# Patient Record
Sex: MALE | Race: WHITE | NOT HISPANIC OR LATINO | Employment: FULL TIME | URBAN - METROPOLITAN AREA
[De-identification: names, ages, dates, MRNs, and addresses within clinical notes are randomized per-mention and may not be internally consistent; named-entity substitution may affect disease eponyms.]

---

## 2017-12-08 ENCOUNTER — OFFICE VISIT (OUTPATIENT)
Dept: FAMILY MEDICINE | Facility: CLINIC | Age: 47
End: 2017-12-08
Payer: MEDICAID

## 2017-12-08 VITALS
SYSTOLIC BLOOD PRESSURE: 114 MMHG | BODY MASS INDEX: 28.95 KG/M2 | TEMPERATURE: 99 F | HEART RATE: 64 BPM | HEIGHT: 68 IN | DIASTOLIC BLOOD PRESSURE: 60 MMHG | RESPIRATION RATE: 18 BRPM | WEIGHT: 191 LBS

## 2017-12-08 DIAGNOSIS — G47.00 INSOMNIA, UNSPECIFIED TYPE: ICD-10-CM

## 2017-12-08 DIAGNOSIS — J32.9 SINUSITIS, UNSPECIFIED CHRONICITY, UNSPECIFIED LOCATION: ICD-10-CM

## 2017-12-08 DIAGNOSIS — F41.9 ANXIETY: Primary | ICD-10-CM

## 2017-12-08 PROCEDURE — 99214 OFFICE O/P EST MOD 30 MIN: CPT | Mod: S$GLB,,, | Performed by: NURSE PRACTITIONER

## 2017-12-08 RX ORDER — TRAZODONE HYDROCHLORIDE 50 MG/1
50 TABLET ORAL NIGHTLY PRN
Qty: 30 TABLET | Refills: 11 | Status: SHIPPED | OUTPATIENT
Start: 2017-12-08 | End: 2018-06-20 | Stop reason: SDUPTHER

## 2017-12-08 RX ORDER — AMOXICILLIN AND CLAVULANATE POTASSIUM 875; 125 MG/1; MG/1
1 TABLET, FILM COATED ORAL EVERY 12 HOURS
Qty: 14 TABLET | Refills: 0 | Status: SHIPPED | OUTPATIENT
Start: 2017-12-08 | End: 2018-06-20 | Stop reason: ALTCHOICE

## 2017-12-08 RX ORDER — DULOXETIN HYDROCHLORIDE 20 MG/1
20 CAPSULE, DELAYED RELEASE ORAL DAILY
Qty: 30 CAPSULE | Refills: 11 | Status: SHIPPED | OUTPATIENT
Start: 2017-12-08 | End: 2018-08-30

## 2017-12-08 NOTE — PROGRESS NOTES
Subjective:       Patient ID: Jimmy Elaine Jr. is a 47 y.o. male.    Chief Complaint: No chief complaint on file.    The patient is here for follow-up visit.  He is with his wife today.  He did suffer significant anxiety since ending of his last job.  We did start Cymbalta at that time.  He does not know if this medication is still helping him or not.  He still has severe anxiety and depression at times. No SI/HI.  He is not sleeping well.  He does feel like he sleeps well with trazodone but he does not have anymore.  He does still have his OCD tendencies.  He is also having URI symptoms. See URI ROS.    URI    This is a new problem. Episode onset: 2 weeks. Associated symptoms include coughing, rhinorrhea, sinus pain, sneezing, a sore throat and swollen glands. Pertinent negatives include no abdominal pain, chest pain, congestion, diarrhea, dysuria, headaches, nausea, rash or vomiting. Associated symptoms comments: Thick green and yellow mucus. He has tried decongestant and acetaminophen for the symptoms. The treatment provided no relief.     Review of Systems   Constitutional: Positive for fatigue. Negative for activity change and appetite change.   HENT: Positive for postnasal drip, rhinorrhea, sinus pain, sinus pressure, sneezing and sore throat. Negative for congestion.    Eyes: Negative for pain and redness.   Respiratory: Positive for cough. Negative for choking, chest tightness and shortness of breath.    Cardiovascular: Negative for chest pain and palpitations.   Gastrointestinal: Negative for abdominal distention, abdominal pain, blood in stool, constipation, diarrhea, nausea and vomiting.   Endocrine: Negative for polydipsia and polyphagia.   Genitourinary: Negative for dysuria and hematuria.   Musculoskeletal: Negative for arthralgias, joint swelling and myalgias.   Skin: Negative for color change, pallor and rash.   Neurological: Negative for dizziness, light-headedness and headaches.  "  Psychiatric/Behavioral: Negative for agitation and behavioral problems.       Objective:       Vitals:    12/08/17 1415   BP: 114/60   Pulse: 64   Resp: 18   Temp: 98.6 °F (37 °C)   TempSrc: Oral   Weight: 86.6 kg (191 lb)   Height: 5' 8" (1.727 m)   PainSc: 0-No pain       Physical Exam   Constitutional: He is oriented to person, place, and time. He appears well-developed and well-nourished. No distress.   HENT:   Head: Normocephalic and atraumatic.   Right Ear: Hearing, tympanic membrane, external ear and ear canal normal.   Left Ear: Hearing, tympanic membrane, external ear and ear canal normal.   Nose: Nose normal. No nose lacerations or sinus tenderness.   Mouth/Throat: Uvula is midline, oropharynx is clear and moist and mucous membranes are normal.   Eyes: Conjunctivae and EOM are normal. Pupils are equal, round, and reactive to light. Right eye exhibits no discharge. Left eye exhibits no discharge. No scleral icterus.   Neck: Trachea normal and normal range of motion. Neck supple. No JVD present. Carotid bruit is not present. No tracheal deviation present. No thyromegaly present.   Cardiovascular: Normal rate and regular rhythm.  Exam reveals no gallop and no friction rub.    No murmur heard.  Pulmonary/Chest: Effort normal and breath sounds normal. No stridor. No respiratory distress. He has no wheezes. He has no rales. He exhibits no tenderness.   Abdominal: Soft. Bowel sounds are normal. He exhibits no distension and no mass. There is no tenderness. There is no rebound and no guarding.   Musculoskeletal: Normal range of motion.   Lymphadenopathy:     He has cervical adenopathy.   Neurological: He is alert and oriented to person, place, and time. Coordination normal.   Skin: Skin is warm and dry. He is not diaphoretic.   Psychiatric: He has a normal mood and affect. His behavior is normal. Judgment and thought content normal.       Assessment:       1. Anxiety    2. Insomnia, unspecified type    3. " Sinusitis, unspecified chronicity, unspecified location        Plan:       Jimmy was seen today for anxiety.    Diagnoses and all orders for this visit:    Anxiety  -     DULoxetine (CYMBALTA) 20 MG capsule; Take 1 capsule (20 mg total) by mouth once daily.  He did not want me to adjust the dose of his medication    Insomnia, unspecified type    -     traZODone (DESYREL) 50 MG tablet; Take 1 tablet (50 mg total) by mouth nightly as needed for Insomnia.    Sinusitis, unspecified chronicity, unspecified location  -     amoxicillin-clavulanate 875-125mg (AUGMENTIN) 875-125 mg per tablet; Take 1 tablet by mouth every 12 (twelve) hours.

## 2017-12-10 RX ORDER — PROMETHAZINE HYDROCHLORIDE AND CODEINE PHOSPHATE 6.25; 1 MG/5ML; MG/5ML
5 SOLUTION ORAL EVERY 4 HOURS PRN
Qty: 240 ML | Refills: 0 | Status: SHIPPED | OUTPATIENT
Start: 2017-12-10 | End: 2017-12-20

## 2018-05-10 ENCOUNTER — TELEPHONE (OUTPATIENT)
Dept: OPHTHALMOLOGY | Facility: CLINIC | Age: 48
End: 2018-05-10

## 2018-05-10 NOTE — TELEPHONE ENCOUNTER
----- Message from Claribel Tyler sent at 5/10/2018 11:37 AM CDT -----  [facility]  Appointment Request From: Jimmy Elaine Jr.    With Provider: Other - (see comments)    Would Accept With:Request to see a new provider    Preferred Date Range: Any date 5/15/2018 or later    Preferred Times: Any    Reason for visit: Request an Appt    Comments:  I need to see an opthamologist.

## 2018-06-20 ENCOUNTER — OFFICE VISIT (OUTPATIENT)
Dept: FAMILY MEDICINE | Facility: CLINIC | Age: 48
End: 2018-06-20
Payer: COMMERCIAL

## 2018-06-20 VITALS
HEART RATE: 72 BPM | BODY MASS INDEX: 28.95 KG/M2 | HEIGHT: 68 IN | TEMPERATURE: 99 F | DIASTOLIC BLOOD PRESSURE: 76 MMHG | WEIGHT: 191 LBS | RESPIRATION RATE: 18 BRPM | SYSTOLIC BLOOD PRESSURE: 132 MMHG

## 2018-06-20 DIAGNOSIS — G47.00 INSOMNIA, UNSPECIFIED TYPE: ICD-10-CM

## 2018-06-20 DIAGNOSIS — M77.11 LATERAL EPICONDYLITIS OF RIGHT ELBOW: Primary | ICD-10-CM

## 2018-06-20 DIAGNOSIS — F41.9 ANXIETY: ICD-10-CM

## 2018-06-20 DIAGNOSIS — Z00.00 LABORATORY EXAM ORDERED AS PART OF ROUTINE GENERAL MEDICAL EXAMINATION: ICD-10-CM

## 2018-06-20 PROCEDURE — 99214 OFFICE O/P EST MOD 30 MIN: CPT | Mod: S$GLB,,, | Performed by: NURSE PRACTITIONER

## 2018-06-20 RX ORDER — TRAZODONE HYDROCHLORIDE 100 MG/1
100 TABLET ORAL NIGHTLY PRN
Qty: 30 TABLET | Refills: 11 | Status: SHIPPED | OUTPATIENT
Start: 2018-06-20 | End: 2018-10-23

## 2018-06-20 RX ORDER — METHYLPREDNISOLONE 4 MG/1
TABLET ORAL
Qty: 1 PACKAGE | Refills: 0 | Status: SHIPPED | OUTPATIENT
Start: 2018-06-20 | End: 2018-07-11

## 2018-06-21 NOTE — PROGRESS NOTES
"Subjective:       Patient ID: Jimmy Elaine Jr. is a 47 y.o. male.    Chief Complaint: Follow-up    The patient is here for a follow-up visit.  He is on Cymbalta 20 mg for his anxiety which he feels has helped a little but he is still having a great deal of anxiety and stress.  He is not interested in increasing his medication but tells me he is undergoing a lot of legal issues at this time with his last this job.        He does tell me that he is having significant elbow pain on the right side from repetitive motion.  He states this has been present for the past couple months and unresponsive to NSAIDs.    S he is still having problems sleeping at night and tells me the trazodone 50 mg is not working anymore.      Review of Systems   Constitutional: Negative for activity change and appetite change.   HENT: Negative for congestion, postnasal drip, rhinorrhea and sinus pressure.    Eyes: Negative for pain and redness.   Respiratory: Negative for choking and chest tightness.    Gastrointestinal: Negative for abdominal distention, abdominal pain, blood in stool, constipation, diarrhea, nausea and vomiting.   Endocrine: Negative for polydipsia and polyphagia.   Genitourinary: Negative for dysuria and hematuria.   Musculoskeletal: Negative for arthralgias and myalgias.   Skin: Negative for color change and rash.   Neurological: Negative for dizziness and headaches.   Psychiatric/Behavioral: Negative for agitation and behavioral problems.       Objective:       Vitals:    06/20/18 1601   BP: 132/76   Pulse: 72   Resp: 18   Temp: 99.2 °F (37.3 °C)   TempSrc: Oral   Weight: 86.6 kg (191 lb)   Height: 5' 8" (1.727 m)   PainSc:   5   PainLoc: Arm     This  Physical Exam   Constitutional: He is oriented to person, place, and time. He appears well-developed and well-nourished. No distress.   HENT:   Head: Normocephalic and atraumatic.   Right Ear: Hearing, tympanic membrane, external ear and ear canal normal.   Left Ear: " Hearing, tympanic membrane, external ear and ear canal normal.   Nose: Nose normal.   Mouth/Throat: Uvula is midline, oropharynx is clear and moist and mucous membranes are normal.   Eyes: Conjunctivae and EOM are normal. Pupils are equal, round, and reactive to light. Right eye exhibits no discharge. Left eye exhibits no discharge.   Neck: Trachea normal and normal range of motion. Neck supple. No JVD present. Carotid bruit is not present. No thyromegaly present.   Cardiovascular: Normal rate and regular rhythm.  Exam reveals no gallop and no friction rub.    No murmur heard.  Pulmonary/Chest: Effort normal and breath sounds normal. No respiratory distress. He has no wheezes. He has no rales. He exhibits no tenderness.   Abdominal: Soft. Bowel sounds are normal. He exhibits no distension and no mass. There is no tenderness. There is no rebound and no guarding.   Musculoskeletal: Normal range of motion.        Arms:  Neurological: He is alert and oriented to person, place, and time. Coordination normal.   Skin: Skin is warm and dry. He is not diaphoretic.   Psychiatric: He has a normal mood and affect. His behavior is normal. Judgment and thought content normal.       Assessment:       1. Lateral epicondylitis of right elbow    2. Laboratory exam ordered as part of routine general medical examination    3. Insomnia, unspecified type    4. Anxiety        Plan:       Jimmy was seen today for follow-up.    Diagnoses and all orders for this visit:    Lateral epicondylitis of right elbow  -     methylPREDNISolone (MEDROL DOSEPACK) 4 mg tablet; use as directed    Laboratory exam ordered as part of routine general medical examination  -     CBC auto differential; Future  -     Comprehensive metabolic panel; Future  -     Hemoglobin A1c; Future  -     Lipid panel; Future  -     TSH; Future    Insomnia, unspecified type  Increase  -     traZODone (DESYREL) 100 MG tablet; Take 1 tablet (100 mg total) by mouth nightly as  needed for Insomnia.    Anxiety  Continue Cymbalta.

## 2018-08-27 RX ORDER — CEPHALEXIN 500 MG/1
500 CAPSULE ORAL EVERY 8 HOURS
Qty: 30 CAPSULE | Refills: 0 | Status: SHIPPED | OUTPATIENT
Start: 2018-08-27 | End: 2018-10-23

## 2018-08-30 RX ORDER — CITALOPRAM 20 MG/1
20 TABLET, FILM COATED ORAL DAILY
Qty: 90 TABLET | Refills: 3 | Status: SHIPPED | OUTPATIENT
Start: 2018-08-30 | End: 2018-10-23

## 2018-09-26 RX ORDER — IBUPROFEN 800 MG/1
800 TABLET ORAL 3 TIMES DAILY
Qty: 30 TABLET | Refills: 0 | Status: SHIPPED | OUTPATIENT
Start: 2018-09-26 | End: 2019-01-31 | Stop reason: SDUPTHER

## 2018-10-09 PROBLEM — M54.12 CERVICAL RADICULOPATHY: Status: ACTIVE | Noted: 2018-10-09

## 2018-11-20 ENCOUNTER — CLINICAL SUPPORT (OUTPATIENT)
Dept: REHABILITATION | Facility: HOSPITAL | Age: 48
End: 2018-11-20
Payer: MEDICAID

## 2018-11-20 DIAGNOSIS — R29.898 DECREASED RANGE OF MOTION OF NECK: ICD-10-CM

## 2018-11-20 DIAGNOSIS — M54.12 RIGHT CERVICAL RADICULOPATHY: ICD-10-CM

## 2018-11-20 DIAGNOSIS — R53.1 WEAKNESS: ICD-10-CM

## 2018-11-20 PROCEDURE — 97110 THERAPEUTIC EXERCISES: CPT | Mod: PO | Performed by: PHYSICAL THERAPIST

## 2018-11-20 PROCEDURE — 97162 PT EVAL MOD COMPLEX 30 MIN: CPT | Mod: PO | Performed by: PHYSICAL THERAPIST

## 2018-11-20 NOTE — PLAN OF CARE
"[facility]  Physical Therapy Initial Evaluation    Name: Jimmy Elaine Jr.  Clinic Number: [id number]    Therapy Diagnosis:   Encounter Diagnoses   Name Primary?    Right cervical radiculopathy     Weakness     Decreased range of motion of neck      Physician: Rodolfo Perez II, *    Physician Orders: PT Eval and Treat   Medical Diagnosis: Cervical radiculopathy at C8  Evaluation Date: 11/20/2018  Authorization: (1) 10/31/19  Plan of Care Certification Period: 1/18/19    Today's Date: 11/20/2018  Visit #: 1/24  Time In: 0705  Time Out: 0800  Total Billable Time: 55 minutes    Precautions: Standard,      Subjective     Date of onset: over a year ago, worsening here recently    History of current condition - Jimmy reports: he has pain 99% of the day. Pain travels from R neck to the post shoulder/shoulder blade, down the back of his R arm, into the dorsal surface of all digits. Pain described as "shooting, burning, electrical, shocking." Location of pain is worse at lat epicondyle and post shoulder. He originally thought he had tennis elbow, but it has gotten significantly worse in the last couple of months. He reports that he can do anything, it just really hurts. Pt is R handed. No issues with L UE. He has found that if he moves his neck a certain way (demonstrating L SB and slightly flexion), then the electrical pains stop.        Past Medical History:   Diagnosis Date    Anxiety     Colon polyps     GERD (gastroesophageal reflux disease)     Kidney stones        Jimmy Elaine Jr.  has a past surgical history that includes Vasectomy; Colonoscopy (06/29/2016); eye syrgery; Eye surgery; Cardiac catheterization (2014); and COLONOSCOPY (N/A, 6/29/2016).    Jimmy has a current medication list which includes the following prescription(s): ibuprofen.    Review of patient's allergies indicates:  No Known Allergies     IMAGING  MRI studies: 1. No acute osseous abnormality " is identified.  2. Multilevel disc osteophyte complexes and facet arthropathy with spinal canal and neural foraminal narrowing noted at multiple levels.  See level by level detail above.  There is no myelomalacia identified    XRAY CERVICAL: There are some mild spurring degenerative changes present.  No displaced fracture or dislocation is appreciated.    XRAY SHOULDER: There is some interval advancing degeneration about the shoulder with spurring and hypertrophy most pronounced about the AC joint as well as subacromial and glenohumeral joint space narrowing.  No interval displaced fracture or dislocation is appreciated.    Prior Therapy: Previous treatment included medical management. No PT this calendar year.  He had tried chiropractor once and didn't enjoy it so he never returned.   Social History: lives with their spouse in a single story home with 0 steps to enter and has no AD/medical equipment.   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 Frequently has to carry 60# buckets of solution. He has to be able to crawl, crouch, lift, etc.   Prior Level of Function: Pt was independent with all ADLs and iADLs without pain, ambulating without pain or deviation, driving independently.       Pain:  Current 5/10, worst 7/10, best 4/10   Location: R UE  Description: electrical, shocking  Aggravating Factors: working  Easing Factors: none    Pts goals: stop pain    Objective     Posture: flexed, rounded shoulders    Cervical Active Range of Motion   Degrees Pain   Flexion 48   Y, R neck        Extension 45   Y+++, R lat epicondyle        L Side Bend 35 Y, R lat epicondyle        R Side Bend 30 Y, R lat epicondyle         L Rotation   DNA DNA        R Rotation   DNA DNA             Strength   Right UE Left UE   Shoulder flexion: 4/5 4-/5   Shoulder extension:  4+/5 4+/5   Shoulder abduction: 4/5 4/5   Shoulder adduction: 4/5 5/5   Shoulder ER  "4/5 5/5   Shoulder IR 4-/5 5/5   Elbow flexion: 4+/5 5/5   Elbow extension: 4+/5 5/5   Wrist flexion: 4/5 5/5   Wrist extension: 4-/5 5/5   Thumb abduction 4-/5 5/5   Digit adduction 4-/5 5/5          Sensation: all peripheral nerves and dermatomes intact and equal bilaterally, except C6<7<8    Reflexes  Biceps: 1+ bilat  Brachioradialis: 1+ bilat  Triceps: 1+ bilat  Herrera: negative bilat    Special Tests  - TOS screen: negative  - VBI: negative  - Transverse Ligament (C2): negative  - Alar Ligament: negative  - Axial vs 3D Traction: positive for symptom reduction  - Suboccipital Release: positive for pain relief  - Mills Test (wrist flex): negative  - Tennis Elbow Test (3rd digit ext): negative for pain     Neuro Dynamic Testing  - Median nerve: DNA  - Ulnar nerve: DNA  - Radial nerve: positive R     Flexibility  - Pec major/minor: restricted bilat  - Levators: WFL bilat    Palpation: overactive cervical/thoracic musculature, very TTP R hand extensor muscle group mid-belly    Joint Mobility: DNA      CMS Impairment/Limitation/Restriction for FOTO NECK Survey    Therapist reviewed FOTO scores for Jimmy Elaine Jr. on 11/20/2018.   FOTO documents entered into [email or web address] - see Media section.    Limitation Score: 63%  Category: Body Position    Current : CL = least 60% but < 80% impaired, limited or restricted  Goal: CJ = at least 20% but < 40% impaired, limited or restricted         TREATMENT     Treatment Time In: 0735  Treatment Time Out: 0800  Total Treatment time separate from Evaluation: 25 min    Jimmy participated in therapeutic exercises to develop strength, endurance, ROM, core stabilization for 5 minutes including:  Chin tuck in supine 10x5"    Jimmy participated in neuromuscular re-education activities to improve nerve mobility for 10 minutes including:  Radial nerve glides on R x10 with hand by side (pro + wrist flex), x10 with arm at 90 deg abd (wrist flex + cervical R SB)    Skip received the " following manual therapy techniques to improve joint and tissue restrictions and pain management for 10 minutes including:  Manual cervical traction in neutral x3 min  Manual cervical traction at end range flexion with towel x5 min      EDUCATION  Education provided:   - Instructed on general anatomy/physiology  - Role of therapy in multi-disciplinary team  - Instructed in purpose of physical therapy and the benefits/risks of treatment  - Risks of refusing treatment  - POC and goals for therapy  No spiritual or educational barriers to learning provided    Written Home Exercises Provided:   Pt was provided with a written copy of exercises to perform at home. Jimmy demonstrated good  understanding of the education provided.     See EMR under Patient Instructions for exercises provided 11/20/2018.    Assessment     Jimmy is a 48 y.o. male referred to outpatient Physical Therapy with a medical diagnosis of Cervical radiculopathy at C8. Pt presents with signs and symptoms consistent with radicular nerve irritation and significant weakness of the lower cervical spine myotomes. Peripheralization of pain noted with progression of symptoms from nerve radicular pain, to myotome weakness, and now presenting with decreased cervical ROM. Pt with increased common extensor muscle group pain during radial nerve glide including pronation. Pt may benefit from DN to this muscle group for suspected concomitant R tennis elbow. Good improvements in pain with cervical traction.     Pt prognosis is Good.   Pt will benefit from skilled outpatient Physical Therapy to address the deficits stated above and in the chart below, provide pt/family education, and to maximize pt's level of independence.     Plan of care discussed with patient: Yes  Pt's spiritual, cultural and educational needs considered and patient is agreeable to the plan of care and goals as stated below:     Anticipated Barriers for therapy: none      Medical Necessity is  demonstrated by the following  History  Co-morbidities and personal factors that may impact the plan of care Co-morbidities:   anxiety, eye surgery    Personal Factors:   no deficits     moderate   Examination  Body Structures and Functions, activity limitations and participation restrictions that may impact the plan of care Body Regions/Systems/Functions:              1) Pain limiting function   2) Decreased ROM   3) Weakness   4) Improper joint mobility   5) Decreased motor control/coordination    Activity Limitations:  Lifting/carrying    Participation Restrictions:  Work duties, home chores, yard work         high   Clinical Presentation unstable clinical presentation with unpredictable characteristics high   Decision Making/ Complexity Score: moderate       GOALS  Short Term Goals:  4 weeks  1. Pt will report <6/10 pain to improve activity tolerance.   2. Pt will increase MMT by 1/3 grade in order to improve functional stability and strength.   3. Pt will increase ROM by 5 degrees in order to improve functional mobility.   4. Pt will tolerate HEP to improve impairments and independence with ADL's.     Long Term Goals: 8 weeks  1. Pt will report <2/10 pain to improve activity tolerance.   2. Pt will report at least CJ on FOTO NECK to demonstrate a decrease in disability and improvement in independence with functional activities.   3. Pt to demonstrate crouching/crawling with minimal increase in pain for improved ability to complete work duties.   4. Pt to carry 60# 5 feet with minimal increase in pain for improved ability to complete work duties.   5. Pt will be I with HEP for self-management of symptoms.     Plan     Certification Period: 11/20/2018 to 1/18/19    Outpatient Physical Therapy 3 times weekly for 8 weeks to include the following interventions: patient education, HEP, therapeutic exercises, neuromuscular re-education, therapeutic activity, manual therapy and dry needling, and modalities PRN to  achieve established goals.     Mojgan Douglas, PT

## 2018-11-23 ENCOUNTER — CLINICAL SUPPORT (OUTPATIENT)
Dept: REHABILITATION | Facility: HOSPITAL | Age: 48
End: 2018-11-23
Payer: MEDICAID

## 2018-11-23 DIAGNOSIS — R29.898 DECREASED RANGE OF MOTION OF NECK: ICD-10-CM

## 2018-11-23 DIAGNOSIS — M54.12 RIGHT CERVICAL RADICULOPATHY: ICD-10-CM

## 2018-11-23 DIAGNOSIS — R53.1 WEAKNESS: ICD-10-CM

## 2018-11-23 PROCEDURE — 97110 THERAPEUTIC EXERCISES: CPT | Mod: PO

## 2018-11-23 PROCEDURE — 97140 MANUAL THERAPY 1/> REGIONS: CPT | Mod: PO

## 2018-11-23 NOTE — PROGRESS NOTES
"Pt seen by Ziggy Abel PTA on this date.     Physical Therapy Daily Treatment Note     Name: Jimmy Elaine Jr.  Clinic Number: [id number]    Therapy Diagnosis:   Encounter Diagnoses   Name Primary?    Right cervical radiculopathy     Weakness     Decreased range of motion of neck      Physician: Rodolfo Perez II, *    Visit Date: 11/23/2018   Physician Orders: PT Eval and Treat   Medical Diagnosis: Cervical radiculopathy at C8  Evaluation Date: 11/20/2018  Authorization: (1) 10/31/19  Plan of Care Certification Period: 1/18/19     Today's Date: 11/20/2018  Visit #: 2/24  Time In: 0705  Time Out: 0800  Total Billable Time: 55 minutes     Precautions: Standard,       Subjective     Pt reports: R shld pn 6/10, states that he continues to have "electrical shock." sx down R LE  He was compliant with home exercise program.  Response to previous treatment: Pt reports some provocation of R UE sx with HEP  Functional change: none    Pain: 6/10  Location: right shoulder      Objective     Skip received therapeutic exercises to develop strength, ROM, flexibility and posture for 15 minutes including:  scap retraction 10x with 3 sec hold  supine iso ext 10x with 3 sec hold  supine chin tuck 10x  seated post cap stretch R shld 3 x 20 sec  standing UT stretch 3 x 20 sec  Radial nerve glide arm at side, 6x due to increase in "electrical " sx  Radial nerve glide arm at 90 degrees, 10x    Skip received the following manual therapy techniques: Manual traction and Soft tissue Mobilization were applied to the: neck for 30 minutes, including:  intermittent cervical traction x 15 min, manual B UT and lev scap stretch x 15 min with decreased R shld pn sx with manual R UT stretch.      Skip received hot pack for 10 minutes to increase circulation and promote tissue healing.      Home Exercises Provided and Patient Education Provided     Education provided:   - Pt instructed in UT stretch  Pt re-instructed in proper form with " HEP to avoid rotation of cervical spine with radial glide ex and excessive flex with chin tucks.  Pt instructed to perform HEP w/in pedro to not provoke sx    Written Home Exercises Provided: Patient instructed to cont prior HEP.  Exercises were reviewed and Royal was able to demonstrate them prior to the end of the session.  Royal demonstrated good  understanding of the education provided.     See EMR under Patient Instructions for exercises provided prior visit.    Assessment     Pt pedro tx with ther ex, manual tx well, decreased post R shld pn with manual UT stretch. Some provocation of R UE sx with review of HEP, improved with proper form post re-instruction  Royal is progressing well towards his goals.   Pt prognosis is Good.     Pt will continue to benefit from skilled outpatient physical therapy to address the deficits listed in the problem list box on initial evaluation, provide pt/family education and to maximize pt's level of independence in the home and community environment.     Pt's spiritual, cultural and educational needs considered and pt agreeable to plan of care and goals.    Anticipated barriers to physical therapy: none    Goals:   Short Term Goals:  4 weeks  1. Pt will report <6/10 pain to improve activity tolerance.   2. Pt will increase MMT by 1/3 grade in order to improve functional stability and strength.   3. Pt will increase ROM by 5 degrees in order to improve functional mobility.   4. Pt will tolerate HEP to improve impairments and independence with ADL's.      Long Term Goals: 8 weeks  1. Pt will report <2/10 pain to improve activity tolerance.   2. Pt will report at least CJ on FOTO NECK to demonstrate a decrease in disability and improvement in independence with functional activities.   3. Pt to demonstrate crouching/crawling with minimal increase in pain for improved ability to complete work duties.   4. Pt to carry 60# 5 feet with minimal increase in pain for improved ability to complete  work duties.   5. Pt will be I with HEP for self-management of symptoms    Plan     Outpatient Physical Therapy 3 times weekly for 8 weeks to include the following interventions: patient education, HEP, therapeutic exercises, neuromuscular re-education, therapeutic activity, manual therapy and dry needling, and modalities PRN to achieve established goals    Ziggy Abel PTA

## 2018-11-26 ENCOUNTER — CLINICAL SUPPORT (OUTPATIENT)
Dept: REHABILITATION | Facility: HOSPITAL | Age: 48
End: 2018-11-26
Payer: MEDICAID

## 2018-11-26 DIAGNOSIS — R53.1 WEAKNESS: ICD-10-CM

## 2018-11-26 DIAGNOSIS — M54.12 RIGHT CERVICAL RADICULOPATHY: ICD-10-CM

## 2018-11-26 DIAGNOSIS — R29.898 DECREASED RANGE OF MOTION OF NECK: ICD-10-CM

## 2018-11-26 PROCEDURE — 97110 THERAPEUTIC EXERCISES: CPT | Mod: PO

## 2018-11-26 NOTE — PROGRESS NOTES
Pt seen by Ziggy Abel PTA on this date.     Physical Therapy Daily Treatment Note     Name: Jimmy Elaine Jr.  Clinic Number: [id number]    Therapy Diagnosis:   Encounter Diagnoses   Name Primary?    Right cervical radiculopathy     Weakness     Decreased range of motion of neck      Physician: Rodolfo Perez II, *    Visit Date: 11/26/2018   Physician Orders: PT Eval and Treat   Medical Diagnosis: Cervical radiculopathy at C8  Evaluation Date: 11/20/2018  Authorization: (12) 12/31/18  Plan of Care Certification Period: 1/18/19     Today's Date: 11/26/2018  Visit #: 3/13  Time In: 0700  Time Out: 0800  Total Billable Time: 60 minutes     Precautions: Standard,       Subjective     Pt reports: Continues to have his normal RUE pain and electrical symptoms. He is having trouble sleeping and finding a comfortable position because his wife doesn't let him sleep on his back due to snoring.   He was compliant with home exercise program.  Response to previous treatment: Pt reports some provocation of R UE sx with HEP  Functional change: none    Pain: 6/10  Location: right shoulder      Objective     Skip received therapeutic exercises to develop strength, ROM, flexibility and posture for 40 minutes including:  scap retraction 20x with 3 sec hold  supine iso ext 10x with 3 sec hold  supine chin tuck 10x  Supine serratus press 5# 2x10  Open book x10 each  seated post cap stretch R shld 3 x 20 sec  standing UT stretch 3 x 20 sec each  Radial nerve glide arm at side, 10x   Radial nerve glide arm at 90 degrees, 10x   AROM to 90 degrees flexion, scaption, abduction with focus on upper trap relaxation x10 each  Positional cervical distraction (flexion +SB) x10 min    Skip received the following manual therapy techniques: Manual traction and Soft tissue Mobilization were applied to the: neck for 20 minutes, including:  intermittent cervical traction, manual B UT and lev scap stretch, suboccipital release, STM to upper  trap and levator scapulae.       Royal received hot pack for 10 minutes to increase circulation and promote tissue healing.- NP      Home Exercises Provided and Patient Education Provided     Education provided:   - Pt instructed in UT stretch  Pt re-instructed in proper form with HEP to avoid rotation of cervical spine with radial glide ex and excessive flex with chin tucks.  Pt instructed to perform HEP w/in pedro to not provoke sx    Written Home Exercises Provided: Patient instructed to cont prior HEP.  Exercises were reviewed and Royal was able to demonstrate them prior to the end of the session.  Royal demonstrated good  understanding of the education provided.     See EMR under Patient Instructions for exercises provided prior visit.    Assessment     Scapular retraction increased electrical symptoms into R arm. Reported getting a charley horse in R arm with radial nerve glide. Reported significant weakness and electrical symptoms at the end of therapy.   Royal is progressing well towards his goals.   Pt prognosis is Good.     Pt will continue to benefit from skilled outpatient physical therapy to address the deficits listed in the problem list box on initial evaluation, provide pt/family education and to maximize pt's level of independence in the home and community environment.     Pt's spiritual, cultural and educational needs considered and pt agreeable to plan of care and goals.    Anticipated barriers to physical therapy: none    Goals:   Short Term Goals:  4 weeks  1. Pt will report <6/10 pain to improve activity tolerance.   2. Pt will increase MMT by 1/3 grade in order to improve functional stability and strength.   3. Pt will increase ROM by 5 degrees in order to improve functional mobility.   4. Pt will tolerate HEP to improve impairments and independence with ADL's.      Long Term Goals: 8 weeks  1. Pt will report <2/10 pain to improve activity tolerance.   2. Pt will report at least CJ on FOTO NECK to  demonstrate a decrease in disability and improvement in independence with functional activities.   3. Pt to demonstrate crouching/crawling with minimal increase in pain for improved ability to complete work duties.   4. Pt to carry 60# 5 feet with minimal increase in pain for improved ability to complete work duties.   5. Pt will be I with HEP for self-management of symptoms    Plan     Outpatient Physical Therapy 3 times weekly for 8 weeks to include the following interventions: patient education, HEP, therapeutic exercises, neuromuscular re-education, therapeutic activity, manual therapy and dry needling, and modalities PRN to achieve established goals    Carla Og, PT

## 2018-11-27 ENCOUNTER — CLINICAL SUPPORT (OUTPATIENT)
Dept: REHABILITATION | Facility: HOSPITAL | Age: 48
End: 2018-11-27
Payer: MEDICAID

## 2018-11-27 DIAGNOSIS — R53.1 WEAKNESS: ICD-10-CM

## 2018-11-27 DIAGNOSIS — M54.12 RIGHT CERVICAL RADICULOPATHY: ICD-10-CM

## 2018-11-27 DIAGNOSIS — R29.898 DECREASED RANGE OF MOTION OF NECK: ICD-10-CM

## 2018-11-27 PROCEDURE — 97110 THERAPEUTIC EXERCISES: CPT | Mod: PO | Performed by: PHYSICAL THERAPIST

## 2018-11-27 PROCEDURE — 97112 NEUROMUSCULAR REEDUCATION: CPT | Mod: PO | Performed by: PHYSICAL THERAPIST

## 2018-11-27 PROCEDURE — 97140 MANUAL THERAPY 1/> REGIONS: CPT | Mod: PO | Performed by: PHYSICAL THERAPIST

## 2018-11-27 NOTE — PROGRESS NOTES
"Pt seen by Ziggy Abel PTA on this date.     Physical Therapy Daily Treatment Note     Name: Jimmy Elaine Jr.  Clinic Number: [id number]    Therapy Diagnosis:   Encounter Diagnoses   Name Primary?    Right cervical radiculopathy     Weakness     Decreased range of motion of neck      Physician: Rodolfo ePrez II, *    Visit Date: 11/27/2018   Physician Orders: PT Eval and Treat   Medical Diagnosis: Cervical radiculopathy at C8  Evaluation Date: 11/20/2018  Authorization: (12) 12/31/18  Plan of Care Certification Period: 1/18/19     Today's Date: 11/27/2018  Visit #: 4/13  Time In: 0700  Time Out: 0800  Total Billable Time: 50 minutes     Precautions: Standard    Subjective     Pt reports: His R shoulder and elbow pain is more bothersome than his neck. He does the nerve glides constantly, which provides very temporary relief (15-20 min). His wife is getting tired of doing the cervical traction. He has had a busy work week too with lots of heavy lifting. He feels like the heat really helps. He notes that the hypothenar surface feels constantly numb, like there's a big callus there and he can't feel anything.   He was compliant with home exercise program.  Response to previous treatment: increased pain with new exercises  Functional change: temporary pain relief with HEP    Pain: 6/10  Location: right shoulder/elbow > neck    Objective     TREATMENT  Skip received therapeutic exercises to develop strength, ROM, flexibility and posture for 20 minutes including:  Passive cervical retraction in supine 5x10" - with manual techniques  Supine chin tuck 10x10"  Supine chin tuck + cervical retraction 10x10"  Supine chin tuck + prep to lift head 10x3"  Sitting chin tuck + scap retract - d/c'd d/t radicular symptoms  Sitting self cervical PAs with pillow case - d/c'd d/t radicular symptoms  Forehead on ball on wall + chin tuck + cervical rotation x15 bilat  Active wrist flex/ext (after manual techniques) 10x5" ea R " only  (NEXT VISIT: pelvic rocking in hooklying)    Royal participated in neuromuscular re-education activities to improve nerve mobilization for 10 minutes including:  Radial nerve glide arm at side x10   Radial nerve glide arm at 90 degrees abd x10   Ulnar nerve glide arm at 90 deg abd x10    Royal received the following manual therapy techniques to improve joint and tissue restrictions and pain management for 20 minutes including:  Intermittent cervical traction at end range flexion for lower cervical spine x10 min  Suboccipital release  G1-2 cervical PA mobs  IASTM R wrist common extensor tendon/muscle group     Royal received the following supervised modalities after being cleared for contradictions for 10 minutes including:   hot pack to cervical spine in supine for 10 minutes at beginning of tx.       EDUCATION  Education provided:   - Progress towards goals   - Role of therapy   - Activity modification    Written Home Exercises Provided:   Pt was not provided with a written copy of exercises to perform at home. Royal demonstrated good  understanding of the education provided.     See EMR under Patient Instructions for exercises provided prior visit.    Assessment     Pt tolerated today's tx well with intermittent flare in radicular symptoms. Pt with positive slump test indicating involvement of dural tension. This improved with erect posturing. Pt able to perform cervical stabilization exercises in supine without worsening symptoms, however pt did not tolerate in sitting position. Improvement in elbow pain with IASTM, indicating involvement of tendinitis symptoms. Suspect that pt's altering body mechanics secondary to neck pain and radicular symptoms are exacerbating previous injuries to R RTC and tennis elbow, which adds to the complexity of tx this pt.       Pt prognosis is Good.   Royal is progressing well towards his goals. Pt will continue to benefit from skilled outpatient physical therapy to address the  deficits listed in the problem list box on initial evaluation, provide pt/family education and to maximize pt's level of independence in the home and community environment.     Pt's spiritual, cultural and educational needs considered and pt agreeable to plan of care and goals.    Anticipated barriers to physical therapy: none    GOALS   Short Term Goals:  4 weeks  1. Pt will report <6/10 pain to improve activity tolerance. Progressing, not met   2. Pt will increase MMT by 1/3 grade in order to improve functional stability and strength. Progressing, not met   3. Pt will increase ROM by 5 degrees in order to improve functional mobility. Progressing, not met   4. Pt will tolerate HEP to improve impairments and independence with ADL's. MET 11/27/18     Long Term Goals: 8 weeks  1. Pt will report <2/10 pain to improve activity tolerance. Progressing, not met   2. Pt will report at least CJ on FOTO NECK to demonstrate a decrease in disability and improvement in independence with functional activities. Progressing, not met   3. Pt to demonstrate crouching/crawling with minimal increase in pain for improved ability to complete work duties. Progressing, not met   4. Pt to carry 60# 5 feet with minimal increase in pain for improved ability to complete work duties. Progressing, not met   5. Pt will be I with HEP for self-management of symptoms. Progressing, not met     Plan     Continue with established POC working toward PT goals.    Mojgan Douglas, PT

## 2018-12-05 ENCOUNTER — CLINICAL SUPPORT (OUTPATIENT)
Dept: REHABILITATION | Facility: HOSPITAL | Age: 48
End: 2018-12-05
Payer: MEDICAID

## 2018-12-05 DIAGNOSIS — R53.1 WEAKNESS: ICD-10-CM

## 2018-12-05 DIAGNOSIS — R29.898 DECREASED RANGE OF MOTION OF NECK: ICD-10-CM

## 2018-12-05 DIAGNOSIS — M54.12 RIGHT CERVICAL RADICULOPATHY: ICD-10-CM

## 2018-12-05 PROCEDURE — 97110 THERAPEUTIC EXERCISES: CPT | Mod: PO

## 2018-12-05 NOTE — PROGRESS NOTES
"  Physical Therapy Daily Treatment Note     Name: Jimmy Elaine Jr.  Clinic Number: [id number]    Therapy Diagnosis:   Encounter Diagnoses   Name Primary?    Right cervical radiculopathy     Weakness     Decreased range of motion of neck      Physician: Rodolfo Perez II, *    Visit Date: 12/5/2018   Physician Orders: PT Eval and Treat   Medical Diagnosis: Cervical radiculopathy at C8  Evaluation Date: 11/20/2018  Authorization: (12) 12/31/18  Plan of Care Certification Period: 1/18/19     Today's Date: 12/5/2018  Visit #: 5/13  Time In: 0700  Time Out: 0800  Total Billable Time: 60 minutes     Precautions: Standard    Subjective     Pt reports: He is feeling about the same today. He has to hold his head to the side more to be comfortable. He has been feeling more pain at midline spine when he is sitting or driving.   He was compliant with home exercise program.  Response to previous treatment: increased pain with new exercises  Functional change: temporary pain relief with HEP    Pain: 6/10  Location: right shoulder/elbow > neck    Objective     TREATMENT  Skip received therapeutic exercises to develop strength, ROM, flexibility and posture for 20 minutes including:  Passive cervical retraction in supine 5x10" - with manual techniques  Supine chin tuck 10x10"  Supine chin tuck + cervical retraction 10x10"  Supine chin tuck + prep to lift head 10x3"   Supine chin tuck + cervical flexion x10  Sitting chin tuck + scap retract - d/c'd d/t radicular symptoms  Sitting self cervical PAs with pillow case - d/c'd d/t radicular symptoms  Forehead on ball on wall + chin tuck + cervical rotation x15 bilat  Active wrist flex/ext (after manual techniques) 10x5" ea R only  Hooklying pelvic tilt 10x5"  Putty gripping x3 min   Putty finger extension x3 min    Skip participated in neuromuscular re-education activities to improve nerve mobilization for 10 minutes including:  Radial nerve glide arm at side x10   Radial nerve " glide arm at 90 degrees abd x10   Ulnar nerve glide arm at 90 deg abd x10    Royal received the following manual therapy techniques to improve joint and tissue restrictions and pain management for 30 minutes including:  Intermittent cervical traction at end range flexion for lower cervical spine x10 min  Suboccipital release  G1-2 cervical PA mobs  IASTM R wrist common extensor tendon/muscle group   Manual median, radial, and ulnar nerve tensioners x10     Royal received the following supervised modalities after being cleared for contradictions for 10 minutes including: -NP  hot pack to cervical spine in supine for 10 minutes at beginning of tx.       EDUCATION  Education provided:   - Progress towards goals   - Role of therapy   - Activity modification    Written Home Exercises Provided:   Pt was not provided with a written copy of exercises to perform at home. Royal demonstrated good  understanding of the education provided.     See EMR under Patient Instructions for exercises provided prior visit.    Assessment     Reports onset of electrical symptoms in hand with pelvic tilt. Difficulty coordinating exercise. Pt reported significant weakness with gripping and finger extension, but no aggravation of tennis elbow or neck symptoms. Continues to report elicitation of neural tension symptoms with nerve glides, but it decreases overall symptoms temporarily.     Pt prognosis is Good.   Royal is progressing well towards his goals. Pt will continue to benefit from skilled outpatient physical therapy to address the deficits listed in the problem list box on initial evaluation, provide pt/family education and to maximize pt's level of independence in the home and community environment.     Pt's spiritual, cultural and educational needs considered and pt agreeable to plan of care and goals.    Anticipated barriers to physical therapy: none    GOALS   Short Term Goals:  4 weeks  1. Pt will report <6/10 pain to improve activity  tolerance. Progressing, not met   2. Pt will increase MMT by 1/3 grade in order to improve functional stability and strength. Progressing, not met   3. Pt will increase ROM by 5 degrees in order to improve functional mobility. Progressing, not met   4. Pt will tolerate HEP to improve impairments and independence with ADL's. MET 11/27/18     Long Term Goals: 8 weeks  1. Pt will report <2/10 pain to improve activity tolerance. Progressing, not met   2. Pt will report at least CJ on FOTO NECK to demonstrate a decrease in disability and improvement in independence with functional activities. Progressing, not met   3. Pt to demonstrate crouching/crawling with minimal increase in pain for improved ability to complete work duties. Progressing, not met   4. Pt to carry 60# 5 feet with minimal increase in pain for improved ability to complete work duties. Progressing, not met   5. Pt will be I with HEP for self-management of symptoms. Progressing, not met     Plan     Continue with established POC working toward PT goals.    Carla Og, PT

## 2018-12-10 ENCOUNTER — CLINICAL SUPPORT (OUTPATIENT)
Dept: REHABILITATION | Facility: HOSPITAL | Age: 48
End: 2018-12-10
Payer: MEDICAID

## 2018-12-10 DIAGNOSIS — R53.1 WEAKNESS: ICD-10-CM

## 2018-12-10 DIAGNOSIS — R29.898 DECREASED RANGE OF MOTION OF NECK: ICD-10-CM

## 2018-12-10 DIAGNOSIS — M54.12 RIGHT CERVICAL RADICULOPATHY: ICD-10-CM

## 2018-12-10 PROCEDURE — 97110 THERAPEUTIC EXERCISES: CPT | Mod: PO

## 2018-12-10 NOTE — PROGRESS NOTES
"  Physical Therapy Daily Treatment Note     Name: Jimmy Elaine Jr.  Clinic Number: [id number]    Therapy Diagnosis:   Encounter Diagnoses   Name Primary?    Right cervical radiculopathy     Weakness     Decreased range of motion of neck      Physician: Rodolfo Perez II, *    Visit Date: 12/10/2018   Physician Orders: PT Eval and Treat   Medical Diagnosis: Cervical radiculopathy at C8  Evaluation Date: 11/20/2018  Authorization: (12) 12/31/18  Plan of Care Certification Period: 1/18/19     Today's Date: 12/10/2018  Visit #: 5/13  Time In: 0700  Time Out: 0800  Total Billable Time: 60 minutes     Precautions: Standard    Subjective     Pt reports: He had difficulty sleeping last night, and had twitching in his arm throughout the night. It feels better this morning.   He was compliant with home exercise program.  Response to previous treatment: increased pain with new exercises  Functional change: temporary pain relief with HEP    Pain: 4/10  Location: right shoulder/elbow > neck    Objective     TREATMENT  Skip received therapeutic exercises to develop strength, ROM, flexibility and posture for 20 minutes including:  Passive cervical retraction in supine 5x10" - with manual techniques  Supine chin tuck 10x10"  Supine chin tuck + cervical retraction 10x10"  Supine chin tuck + prep to lift head 10x3"   Supine chin tuck + cervical flexion x10  Sitting chin tuck + scap retract - d/c'd d/t radicular symptoms  Sitting self cervical PAs with pillow case - d/c'd d/t radicular symptoms  Forehead on ball on wall + chin tuck + cervical rotation x15 bilat  Active wrist flex/ext (after manual techniques) 10x5" ea R only  Sidelying ER 3x10 1#  Hooklying pelvic tilt 10x5"  Putty gripping x3 min   Putty finger extension x3 min    Skip participated in neuromuscular re-education activities to improve nerve mobilization for 10 minutes including:  Radial nerve glide arm at side x10   Radial nerve glide arm at 90 degrees abd " x10   Ulnar nerve glide arm at 90 deg abd x10    Royal received the following manual therapy techniques to improve joint and tissue restrictions and pain management for 30 minutes including:  Intermittent cervical traction at end range flexion for lower cervical spine x10 min  Suboccipital release  G1-2 cervical PA mobs  IASTM R wrist common extensor tendon/muscle group   Manual median, radial, and ulnar nerve tensioners x10     Royal received the following supervised modalities after being cleared for contradictions for 10 minutes including: -NP  hot pack to cervical spine in supine for 10 minutes at beginning of tx.       EDUCATION  Education provided:   - Progress towards goals   - Role of therapy   - Activity modification    Written Home Exercises Provided:   Pt was not provided with a written copy of exercises to perform at home. Royal demonstrated good  understanding of the education provided.     See EMR under Patient Instructions for exercises provided prior visit.    Assessment     Significant fatigue with sidelying ER and gripping activities. Increased sensitivity with soft tissue activities today. Talked to patient about possibility of dry needling at extensor tendons and cervical region for symptom relief.     Pt prognosis is Good.   Royal is progressing well towards his goals. Pt will continue to benefit from skilled outpatient physical therapy to address the deficits listed in the problem list box on initial evaluation, provide pt/family education and to maximize pt's level of independence in the home and community environment.     Pt's spiritual, cultural and educational needs considered and pt agreeable to plan of care and goals.    Anticipated barriers to physical therapy: none    GOALS   Short Term Goals:  4 weeks  1. Pt will report <6/10 pain to improve activity tolerance. Progressing, not met   2. Pt will increase MMT by 1/3 grade in order to improve functional stability and strength. Progressing,  not met   3. Pt will increase ROM by 5 degrees in order to improve functional mobility. Progressing, not met   4. Pt will tolerate HEP to improve impairments and independence with ADL's. MET 11/27/18     Long Term Goals: 8 weeks  1. Pt will report <2/10 pain to improve activity tolerance. Progressing, not met   2. Pt will report at least CJ on FOTO NECK to demonstrate a decrease in disability and improvement in independence with functional activities. Progressing, not met   3. Pt to demonstrate crouching/crawling with minimal increase in pain for improved ability to complete work duties. Progressing, not met   4. Pt to carry 60# 5 feet with minimal increase in pain for improved ability to complete work duties. Progressing, not met   5. Pt will be I with HEP for self-management of symptoms. Progressing, not met     Plan     Continue with established POC working toward PT goals.    Carla Og, PT

## 2018-12-12 ENCOUNTER — CLINICAL SUPPORT (OUTPATIENT)
Dept: REHABILITATION | Facility: HOSPITAL | Age: 48
End: 2018-12-12
Attending: ORTHOPAEDIC SURGERY
Payer: MEDICAID

## 2018-12-12 DIAGNOSIS — R29.898 DECREASED RANGE OF MOTION OF NECK: ICD-10-CM

## 2018-12-12 DIAGNOSIS — M54.12 RIGHT CERVICAL RADICULOPATHY: ICD-10-CM

## 2018-12-12 DIAGNOSIS — R53.1 WEAKNESS: ICD-10-CM

## 2018-12-12 PROCEDURE — 97140 MANUAL THERAPY 1/> REGIONS: CPT | Mod: PO | Performed by: PHYSICAL THERAPIST

## 2018-12-12 PROCEDURE — 97110 THERAPEUTIC EXERCISES: CPT | Mod: PO

## 2018-12-12 NOTE — PROGRESS NOTES
"  Physical Therapy Daily Treatment Note     Name: Jimmy Elaine Jr.  Clinic Number: [id number]    Therapy Diagnosis:   Encounter Diagnoses   Name Primary?    Right cervical radiculopathy     Weakness     Decreased range of motion of neck      Physician: Rodolfo Perez II, *    Visit Date: 12/12/2018   Physician Orders: PT Eval and Treat   Medical Diagnosis: Cervical radiculopathy at C8  Evaluation Date: 11/20/2018  Authorization: (12) 12/31/18  Plan of Care Certification Period: 1/18/19     Today's Date: 12/12/2018  Visit #: 6/13  Time In: 0700  Time Out: 0750  Total Billable Time: 50 minutes     Precautions: Standard    Subjective     Pt reports: He is feeling really good today in the arm and neck, with his forearm pain bothering him more than his neck. He slept well last night. He is willing to try dry needling today.   He was compliant with home exercise program.  Response to previous treatment: increased pain with new exercises  Functional change: temporary pain relief with HEP    Pain: 3/10  Location: right shoulder/elbow > neck    Objective     Resisted supination test: + for tingling/numbness on R    TREATMENT  Skip received therapeutic exercises to develop strength, ROM, flexibility and posture for 20 minutes including:  Passive cervical retraction in supine 5x10" - with manual techniques  Supine chin tuck 10x10"  Supine chin tuck + cervical retraction 10x10"  Supine chin tuck + prep to lift head 10x3"   Supine chin tuck + cervical flexion x10  Sitting chin tuck + scap retract - d/c'd d/t radicular symptoms  Sitting self cervical PAs with pillow case - d/c'd d/t radicular symptoms  Forehead on ball on wall + chin tuck + cervical rotation x15 bilat  Active wrist flex/ext (after manual techniques) 10x5" ea R only  Sidelying ER 3x10 1#  Hooklying pelvic tilt 10x5"  Putty gripping x3 min   Putty finger extension x3 min  Manual wrist flexor stretch, wrist extensor stretch 3x30 sec each  Active wrist " flexion 1# 2x10  Active wrist extension 1# 2x10    Royal participated in neuromuscular re-education activities to improve nerve mobilization for 10 minutes including:  Radial nerve glide arm at side x10   Radial nerve glide arm at 90 degrees abd x10   Ulnar nerve glide arm at 90 deg abd x10    Royal received the following manual therapy techniques to improve joint and tissue restrictions and pain management for 20 minutes including:  Intermittent cervical traction at end range flexion for lower cervical spine x10 min  Suboccipital release  G1-2 cervical PA mobs  IASTM R wrist common extensor tendon/muscle group   Manual median, radial, and ulnar nerve tensioners x10     Royal received the following supervised modalities after being cleared for contradictions for 10 minutes including: -NP  hot pack to cervical spine in supine for 10 minutes at beginning of tx.       EDUCATION  Education provided:   - Progress towards goals   - Role of therapy   - Activity modification    Written Home Exercises Provided:   Pt was not provided with a written copy of exercises to perform at home. Royal demonstrated good  understanding of the education provided.     See EMR under Patient Instructions for exercises provided prior visit.    Assessment     Pt demonstrates neural tension and nerve compression throughout the neck, arm, and forearm, as contraction of wrist extensors and supinators elicits tingling in hand and wrist. Tolerated manual stretching of forearm with minimal electrical symptoms compared to self-stretching. Increased tenderness of wrist extensors today.     Pt prognosis is Good.   Royal is progressing well towards his goals. Pt will continue to benefit from skilled outpatient physical therapy to address the deficits listed in the problem list box on initial evaluation, provide pt/family education and to maximize pt's level of independence in the home and community environment.     Pt's spiritual, cultural and educational  needs considered and pt agreeable to plan of care and goals.    Anticipated barriers to physical therapy: none    GOALS   Short Term Goals:  4 weeks  1. Pt will report <6/10 pain to improve activity tolerance. Progressing, not met   2. Pt will increase MMT by 1/3 grade in order to improve functional stability and strength. Progressing, not met   3. Pt will increase ROM by 5 degrees in order to improve functional mobility. Progressing, not met   4. Pt will tolerate HEP to improve impairments and independence with ADL's. MET 11/27/18     Long Term Goals: 8 weeks  1. Pt will report <2/10 pain to improve activity tolerance. Progressing, not met   2. Pt will report at least CJ on FOTO NECK to demonstrate a decrease in disability and improvement in independence with functional activities. Progressing, not met   3. Pt to demonstrate crouching/crawling with minimal increase in pain for improved ability to complete work duties. Progressing, not met   4. Pt to carry 60# 5 feet with minimal increase in pain for improved ability to complete work duties. Progressing, not met   5. Pt will be I with HEP for self-management of symptoms. Progressing, not met     Plan     Continue with established POC working toward PT goals.    Carla Og, PT

## 2018-12-13 NOTE — PROGRESS NOTES
"  Physical Therapy Daily Treatment Note     Name: Jimmy Elaine Jr.  Clinic Number: [id number]    Therapy Diagnosis:   Encounter Diagnoses   Name Primary?    Right cervical radiculopathy     Weakness     Decreased range of motion of neck      Physician: Rodolfo Perez II, *    Visit Date: 12/12/2018   Physician Orders: PT Eval and Treat   Medical Diagnosis: Cervical radiculopathy at C8  Evaluation Date: 11/20/2018  Authorization: (12) 12/31/18  Plan of Care Certification Period: 1/18/19     Today's Date: 12/12/2018  Visit #: 6/13  Time In: 0750  Time Out: 0810  Total Billable Time: 20 minutes     Precautions: Standard    Subjective     Pt reports: Having a "cramping" sensation from his right elbow down towards his wrist   He was compliant with home exercise program.  Response to previous treatment: increased pain with new exercises  Functional change: temporary pain relief with HEP    Pain: 4/10  Location: right shoulder/elbow > neck    Objective     TREATMENT  Discussed the purpose, mechanism, and indications of dry needling with pt. Pt was cleared of all precautions and contraindications, and pt signed consent form for dry needling performance today by a certified dry needling PT.Also encouraged pt to drink extra water today to dilute metabolic bi-product cumulation. Pt verbalized understanding to all instruction.     Skip received the following manual therapy techniques to improve joint and tissue restrictions and pain management for 20 minutes including:  DNT to R antebrachial cutaneous, deep radial point and 2 symptomatic points distal to the deep radial point in a linear fashion. Electrical stimulation performed with dry needling to intensify the effects of the needling. Needles left in situ for 15 minutes     EDUCATION  Education provided:   - Indications and contraindications of dry needling   - Desired effects of dry needling     Written Home Exercises Provided:   Pt was not provided with a written " copy of exercises to perform at home. Royal demonstrated good  understanding of the education provided.     See EMR under Patient Instructions for exercises provided prior visit.    Assessment     Patient with good tolerance to dry needling. Reduced muscle tone following dry needling. He may require additional sessions to maximize improvements.     Pt prognosis is Good.   Royal is progressing well towards his goals. Pt will continue to benefit from skilled outpatient physical therapy to address the deficits listed in the problem list box on initial evaluation, provide pt/family education and to maximize pt's level of independence in the home and community environment.     Pt's spiritual, cultural and educational needs considered and pt agreeable to plan of care and goals.    Anticipated barriers to physical therapy: none    GOALS   Short Term Goals:  4 weeks  1. Pt will report <6/10 pain to improve activity tolerance. Progressing, not met   2. Pt will increase MMT by 1/3 grade in order to improve functional stability and strength. Progressing, not met   3. Pt will increase ROM by 5 degrees in order to improve functional mobility. Progressing, not met   4. Pt will tolerate HEP to improve impairments and independence with ADL's. MET 11/27/18     Long Term Goals: 8 weeks  1. Pt will report <2/10 pain to improve activity tolerance. Progressing, not met   2. Pt will report at least CJ on FOTO NECK to demonstrate a decrease in disability and improvement in independence with functional activities. Progressing, not met   3. Pt to demonstrate crouching/crawling with minimal increase in pain for improved ability to complete work duties. Progressing, not met   4. Pt to carry 60# 5 feet with minimal increase in pain for improved ability to complete work duties. Progressing, not met   5. Pt will be I with HEP for self-management of symptoms. Progressing, not met     Plan     Continue with established POC working toward PT goals  with inclusion of DNT PRANDERS Wilson, PT

## 2019-01-24 ENCOUNTER — DOCUMENTATION ONLY (OUTPATIENT)
Dept: REHABILITATION | Facility: HOSPITAL | Age: 49
End: 2019-01-24

## 2019-01-24 PROBLEM — R53.1 WEAKNESS: Status: RESOLVED | Noted: 2018-11-20 | Resolved: 2019-01-24

## 2019-01-24 PROBLEM — R29.898 DECREASED RANGE OF MOTION OF NECK: Status: RESOLVED | Noted: 2018-11-20 | Resolved: 2019-01-24

## 2019-01-24 PROBLEM — M54.12 RIGHT CERVICAL RADICULOPATHY: Status: RESOLVED | Noted: 2018-11-20 | Resolved: 2019-01-24

## 2019-01-24 NOTE — PROGRESS NOTES
[facility]  Physical Therapy Discharge Summary    Name: Jimmy Elaine Jr.  Clinic Number: [id number]    Therapy Diagnosis:        Encounter Diagnoses   Name Primary?    Right cervical radiculopathy      Weakness      Decreased range of motion of neck        Physician: Rodolfo Perez II, *     Visit Date: 12/12/2018   Physician Orders: PT Eval and Treat   Medical Diagnosis: Cervical radiculopathy at C8      Date of Last visit: 12/12/18  Total Visits Attended: 6  Cancelled Visits: 4  No Show Visits: 0    Assessment      GOALS   Short Term Goals:  4 weeks  1. Pt will report <6/10 pain to improve activity tolerance. Progressing, not met   2. Pt will increase MMT by 1/3 grade in order to improve functional stability and strength. Progressing, not met   3. Pt will increase ROM by 5 degrees in order to improve functional mobility. Progressing, not met   4. Pt will tolerate HEP to improve impairments and independence with ADL's. MET 11/27/18     Long Term Goals: 8 weeks  1. Pt will report <2/10 pain to improve activity tolerance. Progressing, not met   2. Pt will report at least CJ on FOTO NECK to demonstrate a decrease in disability and improvement in independence with functional activities. Progressing, not met   3. Pt to demonstrate crouching/crawling with minimal increase in pain for improved ability to complete work duties. Progressing, not met   4. Pt to carry 60# 5 feet with minimal increase in pain for improved ability to complete work duties. Progressing, not met   5. Pt will be I with HEP for self-management of symptoms. Progressing, not met     Discharge reason: Patient has not attended therapy since 12/12/18    Plan   This patient is discharged from Physical Therapy    Mojgan [facility], PT

## 2019-01-31 RX ORDER — IBUPROFEN 800 MG/1
800 TABLET ORAL EVERY 6 HOURS PRN
Qty: 30 TABLET | Refills: 1 | Status: SHIPPED | OUTPATIENT
Start: 2019-01-31 | End: 2020-05-15 | Stop reason: SDUPTHER

## 2019-06-19 ENCOUNTER — TELEPHONE (OUTPATIENT)
Dept: DERMATOLOGY | Facility: CLINIC | Age: 49
End: 2019-06-19

## 2019-06-19 NOTE — TELEPHONE ENCOUNTER
Dr. Ramirez is out sick 6-19-10 and called to inform patient we will need to reschedule. patient declined first appt 7-3-19, stating he will fins someone to see him sooner.

## 2019-07-15 ENCOUNTER — TELEPHONE (OUTPATIENT)
Dept: FAMILY MEDICINE | Facility: CLINIC | Age: 49
End: 2019-07-15

## 2019-07-15 RX ORDER — AMOXICILLIN AND CLAVULANATE POTASSIUM 875; 125 MG/1; MG/1
1 TABLET, FILM COATED ORAL 2 TIMES DAILY
Qty: 14 TABLET | Refills: 0 | Status: SHIPPED | OUTPATIENT
Start: 2019-07-15 | End: 2019-07-22

## 2019-09-24 RX ORDER — METRONIDAZOLE 7.5 MG/G
GEL TOPICAL 2 TIMES DAILY
Qty: 45 G | Refills: 3 | Status: SHIPPED | OUTPATIENT
Start: 2019-09-24 | End: 2021-12-17 | Stop reason: ALTCHOICE

## 2020-01-16 ENCOUNTER — TELEPHONE (OUTPATIENT)
Dept: FAMILY MEDICINE | Facility: CLINIC | Age: 50
End: 2020-01-16

## 2020-01-16 DIAGNOSIS — Z00.00 LABORATORY EXAM ORDERED AS PART OF ROUTINE GENERAL MEDICAL EXAMINATION: Primary | ICD-10-CM

## 2020-01-20 ENCOUNTER — LAB VISIT (OUTPATIENT)
Dept: LAB | Facility: HOSPITAL | Age: 50
End: 2020-01-20
Attending: NURSE PRACTITIONER
Payer: COMMERCIAL

## 2020-01-20 DIAGNOSIS — Z00.00 LABORATORY EXAM ORDERED AS PART OF ROUTINE GENERAL MEDICAL EXAMINATION: ICD-10-CM

## 2020-01-20 LAB
ALBUMIN SERPL BCP-MCNC: 3.9 G/DL (ref 3.5–5.2)
ALP SERPL-CCNC: 105 U/L (ref 55–135)
ALT SERPL W/O P-5'-P-CCNC: 30 U/L (ref 10–44)
ANION GAP SERPL CALC-SCNC: 8 MMOL/L (ref 8–16)
AST SERPL-CCNC: 23 U/L (ref 10–40)
BASOPHILS # BLD AUTO: 0.05 K/UL (ref 0–0.2)
BASOPHILS NFR BLD: 0.7 % (ref 0–1.9)
BILIRUB SERPL-MCNC: 1.4 MG/DL (ref 0.1–1)
BUN SERPL-MCNC: 15 MG/DL (ref 6–20)
CALCIUM SERPL-MCNC: 9.1 MG/DL (ref 8.7–10.5)
CHLORIDE SERPL-SCNC: 105 MMOL/L (ref 95–110)
CHOLEST SERPL-MCNC: 298 MG/DL (ref 120–199)
CHOLEST/HDLC SERPL: 6.6 {RATIO} (ref 2–5)
CO2 SERPL-SCNC: 27 MMOL/L (ref 23–29)
CREAT SERPL-MCNC: 1 MG/DL (ref 0.5–1.4)
DIFFERENTIAL METHOD: ABNORMAL
EOSINOPHIL # BLD AUTO: 0.2 K/UL (ref 0–0.5)
EOSINOPHIL NFR BLD: 2.9 % (ref 0–8)
ERYTHROCYTE [DISTWIDTH] IN BLOOD BY AUTOMATED COUNT: 13.1 % (ref 11.5–14.5)
EST. GFR  (AFRICAN AMERICAN): >60 ML/MIN/1.73 M^2
EST. GFR  (NON AFRICAN AMERICAN): >60 ML/MIN/1.73 M^2
ESTIMATED AVG GLUCOSE: 103 MG/DL (ref 68–131)
GLUCOSE SERPL-MCNC: 110 MG/DL (ref 70–110)
HBA1C MFR BLD HPLC: 5.2 % (ref 4–5.6)
HCT VFR BLD AUTO: 51.6 % (ref 40–54)
HDLC SERPL-MCNC: 45 MG/DL (ref 40–75)
HDLC SERPL: 15.1 % (ref 20–50)
HGB BLD-MCNC: 16.9 G/DL (ref 14–18)
IMM GRANULOCYTES # BLD AUTO: 0.1 K/UL (ref 0–0.04)
IMM GRANULOCYTES NFR BLD AUTO: 1.4 % (ref 0–0.5)
LDLC SERPL CALC-MCNC: 204.6 MG/DL (ref 63–159)
LYMPHOCYTES # BLD AUTO: 1.9 K/UL (ref 1–4.8)
LYMPHOCYTES NFR BLD: 26.7 % (ref 18–48)
MCH RBC QN AUTO: 29.8 PG (ref 27–31)
MCHC RBC AUTO-ENTMCNC: 32.8 G/DL (ref 32–36)
MCV RBC AUTO: 91 FL (ref 82–98)
MONOCYTES # BLD AUTO: 0.8 K/UL (ref 0.3–1)
MONOCYTES NFR BLD: 11 % (ref 4–15)
NEUTROPHILS # BLD AUTO: 4 K/UL (ref 1.8–7.7)
NEUTROPHILS NFR BLD: 57.3 % (ref 38–73)
NONHDLC SERPL-MCNC: 253 MG/DL
NRBC BLD-RTO: 0 /100 WBC
PLATELET # BLD AUTO: 258 K/UL (ref 150–350)
PMV BLD AUTO: 10.1 FL (ref 9.2–12.9)
POTASSIUM SERPL-SCNC: 4 MMOL/L (ref 3.5–5.1)
PROT SERPL-MCNC: 7.3 G/DL (ref 6–8.4)
RBC # BLD AUTO: 5.68 M/UL (ref 4.6–6.2)
SODIUM SERPL-SCNC: 140 MMOL/L (ref 136–145)
TRIGL SERPL-MCNC: 242 MG/DL (ref 30–150)
TSH SERPL DL<=0.005 MIU/L-ACNC: 1.89 UIU/ML (ref 0.4–4)
WBC # BLD AUTO: 6.94 K/UL (ref 3.9–12.7)

## 2020-01-20 PROCEDURE — 80061 LIPID PANEL: CPT

## 2020-01-20 PROCEDURE — 85025 COMPLETE CBC W/AUTO DIFF WBC: CPT

## 2020-01-20 PROCEDURE — 84443 ASSAY THYROID STIM HORMONE: CPT

## 2020-01-20 PROCEDURE — 80053 COMPREHEN METABOLIC PANEL: CPT

## 2020-01-20 PROCEDURE — 36415 COLL VENOUS BLD VENIPUNCTURE: CPT | Mod: PO

## 2020-01-20 PROCEDURE — 83036 HEMOGLOBIN GLYCOSYLATED A1C: CPT

## 2020-01-22 ENCOUNTER — TELEPHONE (OUTPATIENT)
Dept: FAMILY MEDICINE | Facility: CLINIC | Age: 50
End: 2020-01-22

## 2020-01-22 ENCOUNTER — OFFICE VISIT (OUTPATIENT)
Dept: FAMILY MEDICINE | Facility: CLINIC | Age: 50
End: 2020-01-22
Payer: COMMERCIAL

## 2020-01-22 VITALS
DIASTOLIC BLOOD PRESSURE: 70 MMHG | TEMPERATURE: 99 F | SYSTOLIC BLOOD PRESSURE: 120 MMHG | BODY MASS INDEX: 30.22 KG/M2 | HEIGHT: 68 IN | WEIGHT: 199.44 LBS | OXYGEN SATURATION: 96 % | HEART RATE: 73 BPM | RESPIRATION RATE: 18 BRPM

## 2020-01-22 DIAGNOSIS — M50.30 DDD (DEGENERATIVE DISC DISEASE), CERVICAL: Primary | ICD-10-CM

## 2020-01-22 PROCEDURE — 99214 PR OFFICE/OUTPT VISIT, EST, LEVL IV, 30-39 MIN: ICD-10-PCS | Mod: S$GLB,,, | Performed by: NURSE PRACTITIONER

## 2020-01-22 PROCEDURE — 99214 OFFICE O/P EST MOD 30 MIN: CPT | Mod: S$GLB,,, | Performed by: NURSE PRACTITIONER

## 2020-01-22 PROCEDURE — 3008F PR BODY MASS INDEX (BMI) DOCUMENTED: ICD-10-PCS | Mod: CPTII,S$GLB,, | Performed by: NURSE PRACTITIONER

## 2020-01-22 PROCEDURE — 3008F BODY MASS INDEX DOCD: CPT | Mod: CPTII,S$GLB,, | Performed by: NURSE PRACTITIONER

## 2020-01-22 RX ORDER — DOXYCYCLINE 100 MG/1
100 CAPSULE ORAL 2 TIMES DAILY
Qty: 20 CAPSULE | Refills: 0 | Status: SHIPPED | OUTPATIENT
Start: 2020-01-22 | End: 2020-02-01

## 2020-01-22 NOTE — TELEPHONE ENCOUNTER
Appointment scheduled.   Emmie is here for right ear problem since feeling sick and flight last weekend.          Pre-visit Screening:  Immunizations:  up to date  Colonoscopy:  NA  Mammogram: is due and to be scheduled by patient for later completion  Asthma Action Test/Plan:  NA  PHQ9:  None  GAD7:  None  Questioned patient about current smoking habits Pt. has never smoked.  Ok to leave detailed message on voice mail for today's visit only Yes, phone # [phone]

## 2020-01-22 NOTE — PROGRESS NOTES
"Subjective:       Patient ID: Jimmy Elaine Jr. is a 49 y.o. male.    Chief Complaint: Hand shaking (Rt hand shanking about one month upon lifting his arm and weakness: Declined Flu Shot)    The patient is here with complaints of cervical disc disease and radicular symptoms on the right side.  Cervical disc disease-did 6 weeks of PATIENT--no help.     Right hand shakes after lifting heavy items and/or when he lifts above his head.  He feels like his right arm is getting weaker.  He was referred to a neurosurgeon months ago but it took a long time to get an appointment so he did not follow up with this.    Review of Systems   Constitutional: Negative for appetite change, chills and fever.   HENT: Negative for ear pain and postnasal drip.    Eyes: Negative for pain and itching.   Respiratory: Negative for chest tightness and shortness of breath.    Gastrointestinal: Negative for abdominal distention and abdominal pain.   Endocrine: Negative for polydipsia and polyuria.   Genitourinary: Negative for difficulty urinating and flank pain.   Musculoskeletal: Positive for neck pain.   Skin: Negative for color change and pallor.   Neurological: Negative for light-headedness and headaches.   Hematological: Negative for adenopathy. Does not bruise/bleed easily.   Psychiatric/Behavioral: Negative for agitation.       Past medical, surgical, family and social history reviewed.  Objective:     Vitals:    01/22/20 1513   BP: 120/70   Pulse: 73   Resp: 18   Temp: 98.5 °F (36.9 °C)   TempSrc: Oral   SpO2: 96%   Weight: 90.5 kg (199 lb 6.5 oz)   Height: 5' 8" (1.727 m)   PainSc:   2   PainLoc: Arm     Body mass index is 30.32 kg/m².     Physical Exam   Constitutional: He is oriented to person, place, and time. He appears well-developed and well-nourished.   HENT:   Head: Normocephalic and atraumatic.   Right Ear: External ear normal.   Left Ear: External ear normal.   Nose: Nose normal.   Mouth/Throat: Oropharynx is clear and " moist.   Eyes: Conjunctivae are normal. Right eye exhibits no discharge. Left eye exhibits no discharge. No scleral icterus.   Neck: Normal range of motion. Neck supple. No tracheal deviation present.   Cardiovascular: Normal rate, regular rhythm and normal heart sounds. Exam reveals no friction rub.   No murmur heard.  Pulmonary/Chest: Effort normal and breath sounds normal. No stridor. No respiratory distress. He has no wheezes. He has no rales. He exhibits no tenderness.   Musculoskeletal: Normal range of motion.   Lymphadenopathy:     He has no cervical adenopathy.   Neurological: He is alert and oriented to person, place, and time.   Weakness to the right extremity.  The patient was able to reproduce tremor when he holds his right hand up   Skin: Skin is warm and dry.   Psychiatric: He has a normal mood and affect.       Assessment:       1. DDD (degenerative disc disease), cervical        Plan:       Jimmy was seen today for hand shaking.    Diagnoses and all orders for this visit:    DDD (degenerative disc disease), cervical  -     Ambulatory consult to Neurology        Patient has recurrent.  Eyes and tells me he needs to see Ophthalmology again.  He has used doxycycline with good results but he is never been able to have these completely gone.  I will treat the patient with doxy prior to the ophthalmology appointment.  -     doxycycline (VIBRAMYCIN) 100 MG Cap; Take 1 capsule (100 mg total) by mouth 2 (two) times daily. for 10 days

## 2020-01-22 NOTE — TELEPHONE ENCOUNTER
Elena Ureña NP is requesting that patient be seen by Neurosurgery.  Patient has previously tried PT for problem.

## 2020-01-23 ENCOUNTER — OFFICE VISIT (OUTPATIENT)
Dept: OPTOMETRY | Facility: CLINIC | Age: 50
End: 2020-01-23
Payer: COMMERCIAL

## 2020-01-23 DIAGNOSIS — H11.001 PTERYGIUM OF RIGHT EYE: ICD-10-CM

## 2020-01-23 DIAGNOSIS — L71.9 ROSACEA: ICD-10-CM

## 2020-01-23 DIAGNOSIS — H01.02B SQUAMOUS BLEPHARITIS OF UPPER AND LOWER EYELIDS OF BOTH EYES: ICD-10-CM

## 2020-01-23 DIAGNOSIS — H01.02A SQUAMOUS BLEPHARITIS OF UPPER AND LOWER EYELIDS OF BOTH EYES: ICD-10-CM

## 2020-01-23 DIAGNOSIS — H00.029 MEIBOMITIS, UNSPECIFIED LATERALITY: ICD-10-CM

## 2020-01-23 DIAGNOSIS — H00.11 CHALAZION OF RIGHT UPPER EYELID: Primary | ICD-10-CM

## 2020-01-23 PROCEDURE — 99999 PR PBB SHADOW E&M-EST. PATIENT-LVL II: CPT | Mod: PBBFAC,,, | Performed by: OPTOMETRIST

## 2020-01-23 PROCEDURE — 92002 INTRM OPH EXAM NEW PATIENT: CPT | Mod: S$GLB,,, | Performed by: OPTOMETRIST

## 2020-01-23 PROCEDURE — 99999 PR PBB SHADOW E&M-EST. PATIENT-LVL II: ICD-10-PCS | Mod: PBBFAC,,, | Performed by: OPTOMETRIST

## 2020-01-23 PROCEDURE — 92002 PR EYE EXAM, NEW PATIENT,INTERMED: ICD-10-PCS | Mod: S$GLB,,, | Performed by: OPTOMETRIST

## 2020-01-23 RX ORDER — TOBRAMYCIN AND DEXAMETHASONE 3; 1 MG/ML; MG/ML
1 SUSPENSION/ DROPS OPHTHALMIC 4 TIMES DAILY
Qty: 2.5 ML | Refills: 1 | Status: SHIPPED | OUTPATIENT
Start: 2020-01-23 | End: 2020-01-30

## 2020-01-23 NOTE — LETTER
January 23, 2020      [place]  [address]  [facility] [address]           [facility]  [address] [place] [address]  Phone: [phone]  Fax: [phone]          Patient: Jimmy Elaine Jr.   MR Number: [id number]   YOB: 1970   Date of Visit: 1/23/2020       Dear Chitra Ureña:    Thank you for referring Jimmy Elaine to me for evaluation. Attached you will find relevant portions of my assessment and plan of care.    If you have questions, please do not hesitate to call me. I look forward to following Jimmy Elaine along with you.    Sincerely,    Dawson Hayward, OD    Enclosure  CC:  No Recipients    If you would like to receive this communication electronically, please contact [email or web address] or [phone] to request more information on [employer] access.    For providers and/or their staff who would like to refer a patient to [facility], please contact us through our one-stop-shop provider referral line, [facility], at [phone].    If you feel you have received this communication in error or would no longer like to receive these types of communications, please e-mail [email or web address]

## 2020-01-29 ENCOUNTER — PATIENT OUTREACH (OUTPATIENT)
Dept: ADMINISTRATIVE | Facility: OTHER | Age: 50
End: 2020-01-29

## 2020-01-30 ENCOUNTER — OFFICE VISIT (OUTPATIENT)
Dept: NEUROSURGERY | Facility: CLINIC | Age: 50
End: 2020-01-30
Payer: COMMERCIAL

## 2020-01-30 DIAGNOSIS — G89.29 CHRONIC NECK PAIN: ICD-10-CM

## 2020-01-30 DIAGNOSIS — M54.2 CHRONIC NECK PAIN: ICD-10-CM

## 2020-01-30 DIAGNOSIS — M54.12 CERVICAL RADICULOPATHY: Primary | ICD-10-CM

## 2020-01-30 PROCEDURE — 99999 PR PBB SHADOW E&M-EST. PATIENT-LVL IV: ICD-10-PCS | Mod: PBBFAC,,, | Performed by: PHYSICIAN ASSISTANT

## 2020-01-30 PROCEDURE — 99204 OFFICE O/P NEW MOD 45 MIN: CPT | Mod: S$GLB,,, | Performed by: PHYSICIAN ASSISTANT

## 2020-01-30 PROCEDURE — 99999 PR PBB SHADOW E&M-EST. PATIENT-LVL IV: CPT | Mod: PBBFAC,,, | Performed by: PHYSICIAN ASSISTANT

## 2020-01-30 PROCEDURE — 99204 PR OFFICE/OUTPT VISIT, NEW, LEVL IV, 45-59 MIN: ICD-10-PCS | Mod: S$GLB,,, | Performed by: PHYSICIAN ASSISTANT

## 2020-01-30 NOTE — LETTER
January 31, 2020      [place]  [address]  [facility] [address]           [facility]  [address] [place] [address]  Phone: [phone]  Fax: [phone]          Patient: Jimmy Elaine Jr.   MR Number: [id number]   YOB: 1970   Date of Visit: 1/30/2020       Dear Chitra Ureña:    Thank you for referring Jimmy Elaine to me for evaluation. Attached you will find relevant portions of my assessment and plan of care.    If you have questions, please do not hesitate to call me. I look forward to following Jimmy Elaine along with you.    Sincerely,    Carla Ramos PA-C    Enclosure  CC:  No Recipients    If you would like to receive this communication electronically, please contact [email or web address] or [phone] to request more information on [employer] [facility] access.    For providers and/or their staff who would like to refer a patient to [facility], please contact us through our one-stop-shop provider referral line, [facility], at [phone].    If you feel you have received this communication in error or would no longer like to receive these types of communications, please e-mail [email or web address]

## 2020-01-30 NOTE — PROGRESS NOTES
[facility]  Clinic Consult     Consult Requested By: Chitra Ureña NP  PCP: Chitra Ureña NP    Chief Complaint:   Chief Complaint   Patient presents with    Cervical Spine Pain (C-spine)     Patient reports cervical pain that radiates into the right arm; patient c/o denies numbness and tingling; pain 3/10         Past Medical History:   Diagnosis Date    Anxiety     Colon polyps     GERD (gastroesophageal reflux disease)     Kidney stones      Past Surgical History:   Procedure Laterality Date    CARDIAC CATHETERIZATION  2014    NORMAL-Dr Homer - angiogram    COLONOSCOPY  06/29/2016    Repeat in 2021    COLONOSCOPY N/A 6/29/2016    Procedure: COLONOSCOPY;  Surgeon: Bony Ramirez MD;  Location: [facility];  Service: Endoscopy;  Laterality: N/A;    eye syrgery      VASECTOMY       Family History   Problem Relation Age of Onset    Lung cancer Mother     Cirrhosis Paternal Grandmother         no etoh, unknown    Glaucoma Neg Hx     Macular degeneration Neg Hx     Retinal detachment Neg Hx      Social History     Tobacco Use    Smoking status: Never Smoker    Smokeless tobacco: Current User     Types: Snuff    Tobacco comment: snuff   Substance Use Topics    Alcohol use: Yes     Alcohol/week: 15.0 standard drinks     Types: 15 Cans of beer per week    Drug use: No      Review of patient's allergies indicates:  No Known Allergies    Current Outpatient Medications:     doxycycline (VIBRAMYCIN) 100 MG Cap, Take 1 capsule (100 mg total) by mouth 2 (two) times daily. for 10 days, Disp: 20 capsule, Rfl: 0    ELDERBERRY FRUIT ORAL, Take by mouth., Disp: , Rfl:     ibuprofen (ADVIL,MOTRIN) 800 MG tablet, Take 1 tablet (800 mg total) by mouth every 6 (six) hours as needed for Pain., Disp: 30 tablet, Rfl: 1    metroNIDAZOLE (METROGEL) 0.75 % gel, Apply topically 2 (two) times daily., Disp: 45 g, Rfl: 3    Review of Systems:   Constitutional: no fever, chills or night sweats.  "No changes in weight   Eyes: no visual changes   ENT: no nasal congestion or sore throat   Respiratory: no cough or shortness of breath   Cardiovascular: no chest pain or palpitations   Gastrointestinal: no nausea or vomiting   Genitourinary: no hematuria or dysuria   Integument/Breast: no rash or pruritis   Hematologic/Lymphatic: no easy bruising or lymphadenopathy   Musculoskeletal: +neck pain, right arm pain   Neurological: no seizures or tremors   Behavioral/Psych: no auditory or visual hallucinations   Endocrine: no heat or cold intolerance         OBJECTIVE:     Vital Signs (Most Recent):  Pulse: 63 (01/30/20 1505)  BP: 110/71 (01/30/20 1505)  Estimated body mass index is 30.32 kg/m² as calculated from the following:    Height as of 1/22/20: 5' 8" (1.727 m).    Weight as of 1/22/20: 90.5 kg (199 lb 6.5 oz).    Physical Exam:   General: well developed, well nourished, no distress.   Neurologic: Alert and oriented. Thought content appropriate. GCS 15.   Head: normocephalic, atraumatic  Eyes: EOMI.  Neck: trachea midline, no JVD   Cardiovascular: no LE edema  Pulmonary: normal respirations, no signs of respiratory distress  Abdomen: non-distended  Sensory: intact to light touch throughout  Skin: Skin is warm, dry and intact.    Motor Strength: Moves all extremities spontaneously with good tone. No abnormal movements seen.     Strength  Deltoids Triceps Biceps Wrist Extension Wrist Flexion Hand  Interossei   Upper: R 5/5 5/5 5/5 5/5 5/5 5/5 5/5    L 5/5 5/5 5/5 5/5 5/5 5-/5 5-/5     Iliopsoas Quadriceps Knee  Flexion Tibialis  anterior Gastro- cnemius EHL    Lower: R 5/5 5/5 5/5 5/5 5/5 5/5     L 5/5 5/5 5/5 5/5 5/5 5/5      DTR's: 2 +   Herrera: absent  Gait: normal    Tandem Gait: No difficulty           Cervical Spine: full ROM, no TTP        Provider Dictation:     Jimmyguillermo Elaine Jr. is a 49 y.o. right handed male who presents for evaluation of neck and right arm pain.  Patient reports onset year and " half ago.  Pain radiates into the right arm in the C8 distribution.  He reports he began taking elderberry significant improvement in his arm pain.  He still has mild residual pain, but he is more concerned about subjective weakness and tremors when he lifts heavy objects.  He denies numbness or tingling in the arm.  He denies dropping objects or difficulty with hand function.  He denies gait instability.  He denies bowel or bladder dysfunction.  He has to repetitively lift 50 lb boxes at work.  He attended physical therapy in the past without relief.  He has not received injections with pain management.    Imaging independently reviewed.  MRI cervical spine from October 2015 reviewed it reveals mild degenerative changes throughout the cervical spine.  There is a right C7-T1 foraminal disc herniation.  There is no spinal cord compression.    On exam, patient is fluid gait.  He is full strength throughout with exception of left hand.  2+ reflexes, negative Herrera's.  Full range of motion cervical spine without tenderness.  Sensation intact to light touch    VAS 3/10  PHQ 4  Neck Disability Index 24    Given the above, I recommend obtaining updated imaging of the cervical spine.  I have ordered flexion-extension x-rays and MRI cervical spine.  Have also ordered EMG/NCS to confirm diagnosis of radiculopathy.  I will call patient with results once complete.  I have also referred the patient to physical therapy.    Patient verbalized understanding of plan. Encouraged to call with any questions or concerns.     This note was partially dictated using voice recognition software, so please excuse any errors that were not corrected.    Cervical radiculopathy  -     X-Ray Cervical Spine AP Lat with Flexion  Extension; Future; Expected date: 01/30/2020  -     MRI Cervical Spine Without Contrast; Future; Expected date: 01/30/2020  -     Ambulatory Referral to Physical/Occupational Therapy  -     EMG W/ ULTRASOUND AND NERVE  CONDUCTION TEST 2 Extremities; Future    Chronic neck pain  -     X-Ray Cervical Spine AP Lat with Flexion  Extension; Future; Expected date: 01/30/2020  -     MRI Cervical Spine Without Contrast; Future; Expected date: 01/30/2020  -     Ambulatory Referral to Physical/Occupational Therapy  -     EMG W/ ULTRASOUND AND NERVE CONDUCTION TEST 2 Extremities; Future

## 2020-01-31 VITALS — SYSTOLIC BLOOD PRESSURE: 110 MMHG | HEART RATE: 63 BPM | DIASTOLIC BLOOD PRESSURE: 71 MMHG

## 2020-03-03 ENCOUNTER — OFFICE VISIT (OUTPATIENT)
Dept: FAMILY MEDICINE | Facility: CLINIC | Age: 50
End: 2020-03-03
Payer: COMMERCIAL

## 2020-03-03 VITALS
DIASTOLIC BLOOD PRESSURE: 78 MMHG | HEART RATE: 73 BPM | TEMPERATURE: 98 F | WEIGHT: 198.06 LBS | SYSTOLIC BLOOD PRESSURE: 112 MMHG | BODY MASS INDEX: 30.02 KG/M2 | HEIGHT: 68 IN

## 2020-03-03 DIAGNOSIS — R22.0 FACIAL SWELLING: ICD-10-CM

## 2020-03-03 DIAGNOSIS — K11.20 SIALADENITIS: Primary | ICD-10-CM

## 2020-03-03 PROCEDURE — 3008F BODY MASS INDEX DOCD: CPT | Mod: CPTII,S$GLB,, | Performed by: NURSE PRACTITIONER

## 2020-03-03 PROCEDURE — 99214 PR OFFICE/OUTPT VISIT, EST, LEVL IV, 30-39 MIN: ICD-10-PCS | Mod: S$GLB,,, | Performed by: NURSE PRACTITIONER

## 2020-03-03 PROCEDURE — 99214 OFFICE O/P EST MOD 30 MIN: CPT | Mod: S$GLB,,, | Performed by: NURSE PRACTITIONER

## 2020-03-03 PROCEDURE — 3008F PR BODY MASS INDEX (BMI) DOCUMENTED: ICD-10-PCS | Mod: CPTII,S$GLB,, | Performed by: NURSE PRACTITIONER

## 2020-03-03 RX ORDER — PREDNISONE 20 MG/1
60 TABLET ORAL DAILY
Qty: 15 TABLET | Refills: 0 | Status: SHIPPED | OUTPATIENT
Start: 2020-03-03 | End: 2020-03-08

## 2020-03-04 ENCOUNTER — TELEPHONE (OUTPATIENT)
Dept: FAMILY MEDICINE | Facility: CLINIC | Age: 50
End: 2020-03-04

## 2020-03-04 NOTE — TELEPHONE ENCOUNTER
----- Message from Chitra Ureña NP sent at 3/4/2020  2:40 PM CST -----  US ordered for patient, would like to schedule for Monday and email patient that it is scheduled

## 2020-03-06 NOTE — PROGRESS NOTES
"Subjective:       Patient ID: Jimmy Elaine Jr. is a 49 y.o. male.    Chief Complaint: Adenopathy    The patient is here with his wife.  The patient's wife tells me that all of a sudden this morning the patient was chewing and he began having significant pain and swelling over his left jaw area.  He has never had this before.  He denies any chest pain or palpitations.  He tells me he is not having any fever or chills.  No redness or warmth over the swollen facial area.    Review of Systems   Constitutional: Negative for appetite change, chills and fever.   HENT: Negative for ear pain and postnasal drip.    Eyes: Negative for pain and itching.   Respiratory: Negative for chest tightness and shortness of breath.    Gastrointestinal: Negative for abdominal distention and abdominal pain.   Endocrine: Negative for polydipsia and polyuria.   Genitourinary: Negative for difficulty urinating and flank pain.   Skin: Negative for color change and pallor.   Neurological: Negative for light-headedness and headaches.   Hematological: Negative for adenopathy. Does not bruise/bleed easily.   Psychiatric/Behavioral: Negative for agitation.       Past medical, surgical, family and social history reviewed.  Objective:     Vitals:    03/03/20 1607   BP: 112/78   Pulse: 73   Temp: 98.4 °F (36.9 °C)   TempSrc: Oral   Weight: 89.8 kg (198 lb 1.3 oz)   Height: 5' 8" (1.727 m)   PainSc: 0-No pain     Body mass index is 30.12 kg/m².     Physical Exam   Constitutional: He is oriented to person, place, and time. He appears well-developed and well-nourished.   HENT:   Head: Normocephalic and atraumatic.       Right Ear: External ear normal.   Left Ear: External ear normal.   Nose: Nose normal.   Mouth/Throat: Oropharynx is clear and moist.   Eyes: Conjunctivae are normal. Right eye exhibits no discharge. Left eye exhibits no discharge. No scleral icterus.   Neck: Normal range of motion. Neck supple. No tracheal deviation present. "   Cardiovascular: Normal rate, regular rhythm and normal heart sounds. Exam reveals no friction rub.   No murmur heard.  Pulmonary/Chest: Effort normal and breath sounds normal. No stridor. No respiratory distress. He has no wheezes. He has no rales. He exhibits no tenderness.   Musculoskeletal: Normal range of motion.   Lymphadenopathy:     He has no cervical adenopathy.   Neurological: He is alert and oriented to person, place, and time.   Skin: Skin is warm and dry.   Psychiatric: He has a normal mood and affect.       Assessment:       1. Sialadenitis    2. Facial swelling        Plan:       Jimmy was seen today for adenopathy.    Diagnoses and all orders for this visit:    Sialadenitis  sialagogues (salivary stimulants such as sour candy), local heat, oral hydration, and massage of the involved gland     Facial swelling-WE WILL DO U/S IF THIS HAS NOT IMPROVED IN THE NEXT 5 DAYS  -     US Soft Tissue Misc; Future    I do not have any reason to believe that this is bacterial.  The patient is afebrile.  I will give him some prednisone to help with the swelling in the pain is he is barely able to open his jaw.  -     predniSONE (DELTASONE) 20 MG tablet; Take 3 tablets (60 mg total) by mouth once daily. for 5 days

## 2020-05-15 RX ORDER — IBUPROFEN 800 MG/1
800 TABLET ORAL EVERY 6 HOURS PRN
Qty: 40 TABLET | Refills: 3 | Status: SHIPPED | OUTPATIENT
Start: 2020-05-15 | End: 2020-08-10 | Stop reason: SDUPTHER

## 2020-07-20 ENCOUNTER — LAB VISIT (OUTPATIENT)
Dept: PRIMARY CARE CLINIC | Facility: OTHER | Age: 50
End: 2020-07-20
Attending: INTERNAL MEDICINE
Payer: COMMERCIAL

## 2020-07-20 DIAGNOSIS — Z03.818 ENCOUNTER FOR OBSERVATION FOR SUSPECTED EXPOSURE TO OTHER BIOLOGICAL AGENTS RULED OUT: ICD-10-CM

## 2020-07-20 PROCEDURE — U0003 INFECTIOUS AGENT DETECTION BY NUCLEIC ACID (DNA OR RNA); SEVERE ACUTE RESPIRATORY SYNDROME CORONAVIRUS 2 (SARS-COV-2) (CORONAVIRUS DISEASE [COVID-19]), AMPLIFIED PROBE TECHNIQUE, MAKING USE OF HIGH THROUGHPUT TECHNOLOGIES AS DESCRIBED BY CMS-2020-01-R: HCPCS

## 2020-07-23 LAB — SARS-COV-2 RNA RESP QL NAA+PROBE: NOT DETECTED

## 2020-08-10 RX ORDER — IBUPROFEN 800 MG/1
800 TABLET ORAL EVERY 6 HOURS PRN
Qty: 40 TABLET | Refills: 3 | Status: SHIPPED | OUTPATIENT
Start: 2020-08-10 | End: 2021-12-17 | Stop reason: ALTCHOICE

## 2021-01-04 ENCOUNTER — CLINICAL SUPPORT (OUTPATIENT)
Dept: URGENT CARE | Facility: CLINIC | Age: 51
End: 2021-01-04
Payer: COMMERCIAL

## 2021-01-04 DIAGNOSIS — Z20.822 ENCOUNTER FOR LABORATORY TESTING FOR COVID-19 VIRUS: Primary | ICD-10-CM

## 2021-01-04 DIAGNOSIS — U07.1 COVID-19 VIRUS DETECTED: ICD-10-CM

## 2021-01-04 LAB
CTP QC/QA: YES
SARS-COV-2 RDRP RESP QL NAA+PROBE: POSITIVE

## 2021-01-04 PROCEDURE — U0002 COVID-19 LAB TEST NON-CDC: HCPCS | Mod: QW,S$GLB,, | Performed by: FAMILY MEDICINE

## 2021-01-04 PROCEDURE — U0002: ICD-10-PCS | Mod: QW,S$GLB,, | Performed by: FAMILY MEDICINE

## 2021-01-26 ENCOUNTER — OFFICE VISIT (OUTPATIENT)
Dept: URGENT CARE | Facility: CLINIC | Age: 51
End: 2021-01-26
Payer: COMMERCIAL

## 2021-01-26 VITALS
HEART RATE: 78 BPM | SYSTOLIC BLOOD PRESSURE: 129 MMHG | DIASTOLIC BLOOD PRESSURE: 88 MMHG | OXYGEN SATURATION: 97 % | RESPIRATION RATE: 16 BRPM | BODY MASS INDEX: 28.04 KG/M2 | HEIGHT: 68 IN | WEIGHT: 185 LBS | TEMPERATURE: 99 F

## 2021-01-26 DIAGNOSIS — R50.9 FEVER, UNSPECIFIED FEVER CAUSE: Primary | ICD-10-CM

## 2021-01-26 LAB
BILIRUB UR QL STRIP: NEGATIVE
COLOR UR: YELLOW
CTP QC/QA: YES
GLUCOSE UR QL STRIP: NEGATIVE
KETONES UR QL STRIP: NEGATIVE
LEUKOCYTE ESTERASE UR QL STRIP: NEGATIVE
PH, POC UA: 7.5 (ref 5–8)
POC BLOOD, URINE: NEGATIVE
POC MOLECULAR INFLUENZA A AGN: NEGATIVE
POC MOLECULAR INFLUENZA B AGN: NEGATIVE
POC NITRATES, URINE: NEGATIVE
PROT UR QL STRIP: NEGATIVE
SP GR UR STRIP: 1.01 (ref 1–1.03)
UROBILINOGEN UR STRIP-ACNC: POSITIVE (ref 0.3–2.2)

## 2021-01-26 PROCEDURE — 87502 INFLUENZA DNA AMP PROBE: CPT | Mod: QW,S$GLB,, | Performed by: PHYSICIAN ASSISTANT

## 2021-01-26 PROCEDURE — 99214 OFFICE O/P EST MOD 30 MIN: CPT | Mod: 25,S$GLB,, | Performed by: PHYSICIAN ASSISTANT

## 2021-01-26 PROCEDURE — 3008F BODY MASS INDEX DOCD: CPT | Mod: CPTII,S$GLB,, | Performed by: PHYSICIAN ASSISTANT

## 2021-01-26 PROCEDURE — 99214 PR OFFICE/OUTPT VISIT, EST, LEVL IV, 30-39 MIN: ICD-10-PCS | Mod: 25,S$GLB,, | Performed by: PHYSICIAN ASSISTANT

## 2021-01-26 PROCEDURE — 81003 POCT URINALYSIS, DIPSTICK, AUTOMATED, W/O SCOPE: ICD-10-PCS | Mod: QW,S$GLB,, | Performed by: PHYSICIAN ASSISTANT

## 2021-01-26 PROCEDURE — 81003 URINALYSIS AUTO W/O SCOPE: CPT | Mod: QW,S$GLB,, | Performed by: PHYSICIAN ASSISTANT

## 2021-01-26 PROCEDURE — 87502 POCT INFLUENZA A/B MOLECULAR: ICD-10-PCS | Mod: QW,S$GLB,, | Performed by: PHYSICIAN ASSISTANT

## 2021-01-26 PROCEDURE — 3008F PR BODY MASS INDEX (BMI) DOCUMENTED: ICD-10-PCS | Mod: CPTII,S$GLB,, | Performed by: PHYSICIAN ASSISTANT

## 2021-04-06 ENCOUNTER — PATIENT MESSAGE (OUTPATIENT)
Dept: ADMINISTRATIVE | Facility: HOSPITAL | Age: 51
End: 2021-04-06

## 2021-04-26 ENCOUNTER — PATIENT MESSAGE (OUTPATIENT)
Dept: FAMILY MEDICINE | Facility: CLINIC | Age: 51
End: 2021-04-26

## 2021-05-06 ENCOUNTER — PATIENT MESSAGE (OUTPATIENT)
Dept: RESEARCH | Facility: HOSPITAL | Age: 51
End: 2021-05-06

## 2021-07-07 ENCOUNTER — PATIENT MESSAGE (OUTPATIENT)
Dept: ADMINISTRATIVE | Facility: HOSPITAL | Age: 51
End: 2021-07-07

## 2021-08-04 ENCOUNTER — IMMUNIZATION (OUTPATIENT)
Dept: FAMILY MEDICINE | Facility: CLINIC | Age: 51
End: 2021-08-04
Payer: COMMERCIAL

## 2021-08-04 DIAGNOSIS — Z23 NEED FOR VACCINATION: Primary | ICD-10-CM

## 2021-08-04 PROCEDURE — 91300 COVID-19, MRNA, LNP-S, PF, 30 MCG/0.3 ML DOSE VACCINE: CPT | Mod: PBBFAC | Performed by: INTERNAL MEDICINE

## 2021-08-25 ENCOUNTER — IMMUNIZATION (OUTPATIENT)
Dept: FAMILY MEDICINE | Facility: CLINIC | Age: 51
End: 2021-08-25
Payer: COMMERCIAL

## 2021-08-25 DIAGNOSIS — Z23 NEED FOR VACCINATION: Primary | ICD-10-CM

## 2021-08-25 PROCEDURE — 91300 COVID-19, MRNA, LNP-S, PF, 30 MCG/0.3 ML DOSE VACCINE: CPT | Mod: PBBFAC | Performed by: INTERNAL MEDICINE

## 2021-08-25 PROCEDURE — 0002A COVID-19, MRNA, LNP-S, PF, 30 MCG/0.3 ML DOSE VACCINE: CPT | Mod: PBBFAC | Performed by: INTERNAL MEDICINE

## 2021-09-10 ENCOUNTER — OFFICE VISIT (OUTPATIENT)
Dept: OPTOMETRY | Facility: CLINIC | Age: 51
End: 2021-09-10
Payer: COMMERCIAL

## 2021-09-10 DIAGNOSIS — H11.001 PTERYGIUM OF RIGHT EYE: ICD-10-CM

## 2021-09-10 DIAGNOSIS — H01.02A SQUAMOUS BLEPHARITIS OF UPPER AND LOWER EYELIDS OF BOTH EYES: Primary | ICD-10-CM

## 2021-09-10 DIAGNOSIS — H00.029 MEIBOMITIS, UNSPECIFIED LATERALITY: ICD-10-CM

## 2021-09-10 DIAGNOSIS — H52.4 BILATERAL PRESBYOPIA: ICD-10-CM

## 2021-09-10 DIAGNOSIS — H01.02B SQUAMOUS BLEPHARITIS OF UPPER AND LOWER EYELIDS OF BOTH EYES: Primary | ICD-10-CM

## 2021-09-10 PROCEDURE — 99999 PR PBB SHADOW E&M-EST. PATIENT-LVL II: ICD-10-PCS | Mod: PBBFAC,,, | Performed by: OPTOMETRIST

## 2021-09-10 PROCEDURE — 92015 PR REFRACTION: ICD-10-PCS | Mod: ,,, | Performed by: OPTOMETRIST

## 2021-09-10 PROCEDURE — 92014 PR EYE EXAM, EST PATIENT,COMPREHESV: ICD-10-PCS | Mod: S$PBB,,, | Performed by: OPTOMETRIST

## 2021-09-10 PROCEDURE — 92015 DETERMINE REFRACTIVE STATE: CPT | Mod: ,,, | Performed by: OPTOMETRIST

## 2021-09-10 PROCEDURE — 92014 COMPRE OPH EXAM EST PT 1/>: CPT | Mod: S$PBB,,, | Performed by: OPTOMETRIST

## 2021-09-10 PROCEDURE — 99999 PR PBB SHADOW E&M-EST. PATIENT-LVL II: CPT | Mod: PBBFAC,,, | Performed by: OPTOMETRIST

## 2021-09-10 RX ORDER — NEOMYCIN SULFATE, POLYMYXIN B SULFATE AND DEXAMETHASONE 3.5; 10000; 1 MG/ML; [USP'U]/ML; MG/ML
1 SUSPENSION/ DROPS OPHTHALMIC 4 TIMES DAILY
Qty: 5 ML | Refills: 1 | Status: SHIPPED | OUTPATIENT
Start: 2021-09-10 | End: 2021-09-26

## 2021-09-13 RX ORDER — AMOXICILLIN AND CLAVULANATE POTASSIUM 875; 125 MG/1; MG/1
1 TABLET, FILM COATED ORAL EVERY 12 HOURS
Qty: 20 TABLET | Refills: 0 | Status: SHIPPED | OUTPATIENT
Start: 2021-09-13 | End: 2021-12-17 | Stop reason: ALTCHOICE

## 2021-11-10 ENCOUNTER — PATIENT MESSAGE (OUTPATIENT)
Dept: FAMILY MEDICINE | Facility: CLINIC | Age: 51
End: 2021-11-10
Payer: COMMERCIAL

## 2021-12-08 DIAGNOSIS — R05.9 COUGH: Primary | ICD-10-CM

## 2021-12-08 DIAGNOSIS — R05.9 COUGH: ICD-10-CM

## 2021-12-08 RX ORDER — GUAIFENESIN 1200 MG/1
1 TABLET, EXTENDED RELEASE ORAL EVERY 12 HOURS
Qty: 60 TABLET | Refills: 0 | Status: SHIPPED | OUTPATIENT
Start: 2021-12-08 | End: 2021-12-08 | Stop reason: SDUPTHER

## 2021-12-08 RX ORDER — PROMETHAZINE HYDROCHLORIDE 6.25 MG/5ML
10 SYRUP ORAL EVERY 6 HOURS PRN
Qty: 118 ML | Refills: 1 | Status: SHIPPED | OUTPATIENT
Start: 2021-12-08 | End: 2021-12-17 | Stop reason: ALTCHOICE

## 2021-12-08 RX ORDER — MONTELUKAST SODIUM 10 MG/1
10 TABLET ORAL NIGHTLY
Qty: 90 TABLET | Refills: 0 | Status: SHIPPED | OUTPATIENT
Start: 2021-12-08 | End: 2021-12-17 | Stop reason: ALTCHOICE

## 2021-12-08 RX ORDER — AZITHROMYCIN 500 MG/1
TABLET, FILM COATED ORAL
Qty: 7 TABLET | Refills: 0 | Status: SHIPPED | OUTPATIENT
Start: 2021-12-08 | End: 2021-12-17 | Stop reason: ALTCHOICE

## 2021-12-08 RX ORDER — ALBUTEROL SULFATE 90 UG/1
1 AEROSOL, METERED RESPIRATORY (INHALATION) EVERY 6 HOURS PRN
Qty: 8.5 G | Refills: 2 | Status: SHIPPED | OUTPATIENT
Start: 2021-12-08 | End: 2024-01-30

## 2021-12-08 RX ORDER — GUAIFENESIN 1200 MG/1
1 TABLET, EXTENDED RELEASE ORAL 2 TIMES DAILY
Qty: 60 TABLET | Refills: 0 | Status: SHIPPED | OUTPATIENT
Start: 2021-12-08 | End: 2021-12-22

## 2021-12-08 RX ORDER — AZITHROMYCIN 500 MG/1
TABLET, FILM COATED ORAL
Qty: 8 TABLET | Refills: 0 | Status: SHIPPED | OUTPATIENT
Start: 2021-12-08 | End: 2021-12-08 | Stop reason: SDUPTHER

## 2021-12-08 RX ORDER — PROMETHAZINE HYDROCHLORIDE 6.25 MG/5ML
10 SYRUP ORAL EVERY 6 HOURS PRN
Qty: 118 ML | Refills: 1 | Status: SHIPPED | OUTPATIENT
Start: 2021-12-08 | End: 2021-12-08 | Stop reason: SDUPTHER

## 2021-12-08 RX ORDER — FLUTICASONE PROPIONATE 50 MCG
2 SPRAY, SUSPENSION (ML) NASAL DAILY
Qty: 16 G | Refills: 2 | Status: SHIPPED | OUTPATIENT
Start: 2021-12-08 | End: 2024-01-23

## 2021-12-08 RX ORDER — MONTELUKAST SODIUM 10 MG/1
10 TABLET ORAL NIGHTLY
Qty: 90 TABLET | Refills: 0 | Status: SHIPPED | OUTPATIENT
Start: 2021-12-08 | End: 2021-12-08 | Stop reason: SDUPTHER

## 2021-12-08 RX ORDER — ALBUTEROL SULFATE 90 UG/1
1 AEROSOL, METERED RESPIRATORY (INHALATION) EVERY 6 HOURS PRN
Qty: 6.7 G | Refills: 2 | Status: SHIPPED | OUTPATIENT
Start: 2021-12-08 | End: 2021-12-08 | Stop reason: SDUPTHER

## 2021-12-17 ENCOUNTER — OFFICE VISIT (OUTPATIENT)
Dept: FAMILY MEDICINE | Facility: CLINIC | Age: 51
End: 2021-12-17
Payer: COMMERCIAL

## 2021-12-17 VITALS
HEART RATE: 70 BPM | OXYGEN SATURATION: 97 % | BODY MASS INDEX: 30.56 KG/M2 | RESPIRATION RATE: 20 BRPM | HEIGHT: 68 IN | TEMPERATURE: 98 F | SYSTOLIC BLOOD PRESSURE: 118 MMHG | WEIGHT: 201.63 LBS | DIASTOLIC BLOOD PRESSURE: 70 MMHG

## 2021-12-17 DIAGNOSIS — J01.00 ACUTE MAXILLARY SINUSITIS, RECURRENCE NOT SPECIFIED: ICD-10-CM

## 2021-12-17 DIAGNOSIS — R05.9 COUGH: ICD-10-CM

## 2021-12-17 DIAGNOSIS — R06.2 WHEEZING: ICD-10-CM

## 2021-12-17 DIAGNOSIS — J20.9 BRONCHITIS WITH BRONCHOSPASM: Primary | ICD-10-CM

## 2021-12-17 PROCEDURE — 96372 THER/PROPH/DIAG INJ SC/IM: CPT | Mod: S$GLB,,, | Performed by: NURSE PRACTITIONER

## 2021-12-17 PROCEDURE — 99214 PR OFFICE/OUTPT VISIT, EST, LEVL IV, 30-39 MIN: ICD-10-PCS | Mod: 25,S$GLB,, | Performed by: NURSE PRACTITIONER

## 2021-12-17 PROCEDURE — 99214 OFFICE O/P EST MOD 30 MIN: CPT | Mod: 25,S$GLB,, | Performed by: NURSE PRACTITIONER

## 2021-12-17 PROCEDURE — 96372 PR INJECTION,THERAP/PROPH/DIAG2ST, IM OR SUBCUT: ICD-10-PCS | Mod: S$GLB,,, | Performed by: NURSE PRACTITIONER

## 2021-12-17 RX ORDER — AZITHROMYCIN 250 MG/1
TABLET, FILM COATED ORAL
Qty: 6 TABLET | Refills: 0 | Status: SHIPPED | OUTPATIENT
Start: 2021-12-17 | End: 2021-12-22

## 2021-12-17 RX ORDER — DEXAMETHASONE SODIUM PHOSPHATE 4 MG/ML
8 INJECTION, SOLUTION INTRA-ARTICULAR; INTRALESIONAL; INTRAMUSCULAR; INTRAVENOUS; SOFT TISSUE
Status: COMPLETED | OUTPATIENT
Start: 2021-12-17 | End: 2021-12-17

## 2021-12-17 RX ORDER — PROMETHAZINE HYDROCHLORIDE AND CODEINE PHOSPHATE 6.25; 1 MG/5ML; MG/5ML
5 SOLUTION ORAL EVERY 4 HOURS PRN
Qty: 240 ML | Refills: 0 | Status: SHIPPED | OUTPATIENT
Start: 2021-12-17 | End: 2021-12-27

## 2021-12-17 RX ORDER — PREDNISONE 20 MG/1
TABLET ORAL
Qty: 10 TABLET | Refills: 0 | Status: SHIPPED | OUTPATIENT
Start: 2021-12-17 | End: 2021-12-25

## 2021-12-17 RX ADMIN — DEXAMETHASONE SODIUM PHOSPHATE 8 MG: 4 INJECTION, SOLUTION INTRA-ARTICULAR; INTRALESIONAL; INTRAMUSCULAR; INTRAVENOUS; SOFT TISSUE at 08:12

## 2022-01-20 DIAGNOSIS — M54.2 CERVICAL SPINE PAIN: ICD-10-CM

## 2022-01-20 DIAGNOSIS — M25.512 ACUTE PAIN OF BOTH SHOULDERS: Primary | ICD-10-CM

## 2022-01-20 DIAGNOSIS — M25.511 ACUTE PAIN OF BOTH SHOULDERS: Primary | ICD-10-CM

## 2022-01-20 RX ORDER — METHOCARBAMOL 750 MG/1
750 TABLET, FILM COATED ORAL 4 TIMES DAILY PRN
Qty: 44 TABLET | Refills: 3 | Status: SHIPPED | OUTPATIENT
Start: 2022-01-20 | End: 2024-01-23

## 2022-02-02 ENCOUNTER — TELEPHONE (OUTPATIENT)
Dept: PAIN MEDICINE | Facility: CLINIC | Age: 52
End: 2022-02-02
Payer: COMMERCIAL

## 2022-02-02 NOTE — TELEPHONE ENCOUNTER
----- Message from Sugey Miles MA sent at 2/2/2022 12:52 PM CST -----  Contact: pt, wife,  justino Canales referral ,   Can this pt be scheduled Monday   Call back extension [phone]  or

## 2022-02-09 ENCOUNTER — PATIENT OUTREACH (OUTPATIENT)
Dept: ADMINISTRATIVE | Facility: OTHER | Age: 52
End: 2022-02-09
Payer: COMMERCIAL

## 2022-02-10 NOTE — PROGRESS NOTES
Health Maintenance Due   Topic Date Due    Pneumococcal Vaccines (Age 0-64) (1 of 2 - PPSV23) Never done    HIV Screening  Never done    Shingles Vaccine (1 of 2) Never done    Colorectal Cancer Screening  06/29/2021    Influenza Vaccine (1) 09/01/2021    COVID-19 Vaccine (3 - Booster for Pfizer series) 02/25/2022     Updates were requested from care everywhere.  Chart was reviewed for overdue Proactive [facility] Encounters (PAUL) topics (CRS, Breast Cancer Screening, Eye exam)  Health Maintenance has been updated.  LINKS immunization registry triggered.  Immunizations were reconciled.

## 2022-06-11 ENCOUNTER — PATIENT MESSAGE (OUTPATIENT)
Dept: FAMILY MEDICINE | Facility: CLINIC | Age: 52
End: 2022-06-11
Payer: COMMERCIAL

## 2023-08-17 ENCOUNTER — PATIENT MESSAGE (OUTPATIENT)
Dept: FAMILY MEDICINE | Facility: CLINIC | Age: 53
End: 2023-08-17
Payer: COMMERCIAL

## 2023-08-17 DIAGNOSIS — Z12.5 PROSTATE CANCER SCREENING: Primary | ICD-10-CM

## 2023-08-17 DIAGNOSIS — Z00.00 LABORATORY EXAM ORDERED AS PART OF ROUTINE GENERAL MEDICAL EXAMINATION: ICD-10-CM

## 2023-08-29 ENCOUNTER — LAB VISIT (OUTPATIENT)
Dept: LAB | Facility: HOSPITAL | Age: 53
End: 2023-08-29
Attending: NURSE PRACTITIONER
Payer: COMMERCIAL

## 2023-08-29 DIAGNOSIS — Z00.00 LABORATORY EXAM ORDERED AS PART OF ROUTINE GENERAL MEDICAL EXAMINATION: ICD-10-CM

## 2023-08-29 DIAGNOSIS — Z12.5 PROSTATE CANCER SCREENING: ICD-10-CM

## 2023-08-29 LAB
ALBUMIN SERPL BCP-MCNC: 3.9 G/DL (ref 3.5–5.2)
ALP SERPL-CCNC: 94 U/L (ref 55–135)
ALT SERPL W/O P-5'-P-CCNC: 27 U/L (ref 10–44)
ANION GAP SERPL CALC-SCNC: 9 MMOL/L (ref 8–16)
AST SERPL-CCNC: 21 U/L (ref 10–40)
BASOPHILS # BLD AUTO: 0.07 K/UL (ref 0–0.2)
BASOPHILS NFR BLD: 0.6 % (ref 0–1.9)
BILIRUB SERPL-MCNC: 2.2 MG/DL (ref 0.1–1)
BUN SERPL-MCNC: 19 MG/DL (ref 6–20)
CALCIUM SERPL-MCNC: 8.9 MG/DL (ref 8.7–10.5)
CHLORIDE SERPL-SCNC: 104 MMOL/L (ref 95–110)
CHOLEST SERPL-MCNC: 246 MG/DL (ref 120–199)
CHOLEST/HDLC SERPL: 5.3 {RATIO} (ref 2–5)
CO2 SERPL-SCNC: 26 MMOL/L (ref 23–29)
COMPLEXED PSA SERPL-MCNC: 0.49 NG/ML (ref 0–4)
CREAT SERPL-MCNC: 1 MG/DL (ref 0.5–1.4)
DIFFERENTIAL METHOD: ABNORMAL
EOSINOPHIL # BLD AUTO: 0.1 K/UL (ref 0–0.5)
EOSINOPHIL NFR BLD: 1.2 % (ref 0–8)
ERYTHROCYTE [DISTWIDTH] IN BLOOD BY AUTOMATED COUNT: 13.2 % (ref 11.5–14.5)
EST. GFR  (NO RACE VARIABLE): >60 ML/MIN/1.73 M^2
ESTIMATED AVG GLUCOSE: 97 MG/DL (ref 68–131)
GLUCOSE SERPL-MCNC: 100 MG/DL (ref 70–110)
HBA1C MFR BLD: 5 % (ref 4–5.6)
HCT VFR BLD AUTO: 47.2 % (ref 40–54)
HDLC SERPL-MCNC: 46 MG/DL (ref 40–75)
HDLC SERPL: 18.7 % (ref 20–50)
HGB BLD-MCNC: 16 G/DL (ref 14–18)
IMM GRANULOCYTES # BLD AUTO: 0.37 K/UL (ref 0–0.04)
IMM GRANULOCYTES NFR BLD AUTO: 3.4 % (ref 0–0.5)
LDLC SERPL CALC-MCNC: 170.6 MG/DL (ref 63–159)
LYMPHOCYTES # BLD AUTO: 2.4 K/UL (ref 1–4.8)
LYMPHOCYTES NFR BLD: 21.9 % (ref 18–48)
MCH RBC QN AUTO: 29 PG (ref 27–31)
MCHC RBC AUTO-ENTMCNC: 33.9 G/DL (ref 32–36)
MCV RBC AUTO: 86 FL (ref 82–98)
MONOCYTES # BLD AUTO: 1 K/UL (ref 0.3–1)
MONOCYTES NFR BLD: 9.2 % (ref 4–15)
NEUTROPHILS # BLD AUTO: 7 K/UL (ref 1.8–7.7)
NEUTROPHILS NFR BLD: 63.7 % (ref 38–73)
NONHDLC SERPL-MCNC: 200 MG/DL
NRBC BLD-RTO: 0 /100 WBC
PLATELET # BLD AUTO: 290 K/UL (ref 150–450)
PMV BLD AUTO: 10.5 FL (ref 9.2–12.9)
POTASSIUM SERPL-SCNC: 4.2 MMOL/L (ref 3.5–5.1)
PROT SERPL-MCNC: 7.1 G/DL (ref 6–8.4)
RBC # BLD AUTO: 5.52 M/UL (ref 4.6–6.2)
SODIUM SERPL-SCNC: 139 MMOL/L (ref 136–145)
TRIGL SERPL-MCNC: 147 MG/DL (ref 30–150)
TSH SERPL DL<=0.005 MIU/L-ACNC: 2.29 UIU/ML (ref 0.4–4)
WBC # BLD AUTO: 10.93 K/UL (ref 3.9–12.7)

## 2023-08-29 PROCEDURE — 80061 LIPID PANEL: CPT | Performed by: NURSE PRACTITIONER

## 2023-08-29 PROCEDURE — 84443 ASSAY THYROID STIM HORMONE: CPT | Performed by: NURSE PRACTITIONER

## 2023-08-29 PROCEDURE — 80053 COMPREHEN METABOLIC PANEL: CPT | Performed by: NURSE PRACTITIONER

## 2023-08-29 PROCEDURE — 85025 COMPLETE CBC W/AUTO DIFF WBC: CPT | Performed by: NURSE PRACTITIONER

## 2023-08-29 PROCEDURE — 83036 HEMOGLOBIN GLYCOSYLATED A1C: CPT | Performed by: NURSE PRACTITIONER

## 2023-08-29 PROCEDURE — 36415 COLL VENOUS BLD VENIPUNCTURE: CPT | Mod: PO | Performed by: NURSE PRACTITIONER

## 2023-08-29 PROCEDURE — 84153 ASSAY OF PSA TOTAL: CPT | Performed by: NURSE PRACTITIONER

## 2023-08-29 NOTE — PROGRESS NOTES
I have released your blood work.  We will review this in detail at our upcoming visit.  Have a great day.    Elena Ureña [facility]  [address]  [place] [address]  [phone] (phone)  [phone] (fax)

## 2023-08-30 ENCOUNTER — OFFICE VISIT (OUTPATIENT)
Dept: FAMILY MEDICINE | Facility: CLINIC | Age: 53
End: 2023-08-30
Payer: COMMERCIAL

## 2023-08-30 VITALS
RESPIRATION RATE: 20 BRPM | TEMPERATURE: 98 F | BODY MASS INDEX: 28.18 KG/M2 | HEIGHT: 68 IN | DIASTOLIC BLOOD PRESSURE: 76 MMHG | SYSTOLIC BLOOD PRESSURE: 126 MMHG | OXYGEN SATURATION: 96 % | WEIGHT: 185.94 LBS | HEART RATE: 60 BPM

## 2023-08-30 DIAGNOSIS — K63.5 POLYP OF COLON, UNSPECIFIED PART OF COLON, UNSPECIFIED TYPE: ICD-10-CM

## 2023-08-30 DIAGNOSIS — Z23 IMMUNIZATION DUE: ICD-10-CM

## 2023-08-30 DIAGNOSIS — M19.90 ARTHRITIS: ICD-10-CM

## 2023-08-30 DIAGNOSIS — Z00.00 ROUTINE PHYSICAL EXAMINATION: Primary | ICD-10-CM

## 2023-08-30 PROCEDURE — 90471 IMMUNIZATION ADMIN: CPT | Mod: S$GLB,,, | Performed by: NURSE PRACTITIONER

## 2023-08-30 PROCEDURE — 99396 PREV VISIT EST AGE 40-64: CPT | Mod: 25,S$GLB,, | Performed by: NURSE PRACTITIONER

## 2023-08-30 PROCEDURE — 90677 PNEUMOCOCCAL CONJUGATE VACCINE 20-VALENT: ICD-10-PCS | Mod: S$GLB,,, | Performed by: NURSE PRACTITIONER

## 2023-08-30 PROCEDURE — 3008F PR BODY MASS INDEX (BMI) DOCUMENTED: ICD-10-PCS | Mod: CPTII,S$GLB,, | Performed by: NURSE PRACTITIONER

## 2023-08-30 PROCEDURE — 1160F RVW MEDS BY RX/DR IN RCRD: CPT | Mod: CPTII,S$GLB,, | Performed by: NURSE PRACTITIONER

## 2023-08-30 PROCEDURE — 90471 PNEUMOCOCCAL CONJUGATE VACCINE 20-VALENT: ICD-10-PCS | Mod: S$GLB,,, | Performed by: NURSE PRACTITIONER

## 2023-08-30 PROCEDURE — 3074F PR MOST RECENT SYSTOLIC BLOOD PRESSURE < 130 MM HG: ICD-10-PCS | Mod: CPTII,S$GLB,, | Performed by: NURSE PRACTITIONER

## 2023-08-30 PROCEDURE — 3074F SYST BP LT 130 MM HG: CPT | Mod: CPTII,S$GLB,, | Performed by: NURSE PRACTITIONER

## 2023-08-30 PROCEDURE — 3078F DIAST BP <80 MM HG: CPT | Mod: CPTII,S$GLB,, | Performed by: NURSE PRACTITIONER

## 2023-08-30 PROCEDURE — 3008F BODY MASS INDEX DOCD: CPT | Mod: CPTII,S$GLB,, | Performed by: NURSE PRACTITIONER

## 2023-08-30 PROCEDURE — 1160F PR REVIEW ALL MEDS BY PRESCRIBER/CLIN PHARMACIST DOCUMENTED: ICD-10-PCS | Mod: CPTII,S$GLB,, | Performed by: NURSE PRACTITIONER

## 2023-08-30 PROCEDURE — 1159F MED LIST DOCD IN RCRD: CPT | Mod: CPTII,S$GLB,, | Performed by: NURSE PRACTITIONER

## 2023-08-30 PROCEDURE — 3044F HG A1C LEVEL LT 7.0%: CPT | Mod: CPTII,S$GLB,, | Performed by: NURSE PRACTITIONER

## 2023-08-30 PROCEDURE — 3044F PR MOST RECENT HEMOGLOBIN A1C LEVEL <7.0%: ICD-10-PCS | Mod: CPTII,S$GLB,, | Performed by: NURSE PRACTITIONER

## 2023-08-30 PROCEDURE — 99396 PR PREVENTIVE VISIT,EST,40-64: ICD-10-PCS | Mod: 25,S$GLB,, | Performed by: NURSE PRACTITIONER

## 2023-08-30 PROCEDURE — 90677 PCV20 VACCINE IM: CPT | Mod: S$GLB,,, | Performed by: NURSE PRACTITIONER

## 2023-08-30 PROCEDURE — 3078F PR MOST RECENT DIASTOLIC BLOOD PRESSURE < 80 MM HG: ICD-10-PCS | Mod: CPTII,S$GLB,, | Performed by: NURSE PRACTITIONER

## 2023-08-30 PROCEDURE — 1159F PR MEDICATION LIST DOCUMENTED IN MEDICAL RECORD: ICD-10-PCS | Mod: CPTII,S$GLB,, | Performed by: NURSE PRACTITIONER

## 2023-08-30 RX ORDER — DICLOFENAC SODIUM AND MISOPROSTOL 75; 200 MG/1; UG/1
1 TABLET, DELAYED RELEASE ORAL 2 TIMES DAILY PRN
Qty: 60 TABLET | Refills: 3 | Status: SHIPPED | OUTPATIENT
Start: 2023-08-30 | End: 2024-01-23

## 2023-08-30 NOTE — PROGRESS NOTES
"Subjective:       Patient ID: Jimmy Elaine Jr. is a 53 y.o. male.    Chief Complaint: Annual Exam    The patient is here for routine physical. Patient is generally feeling well without any complaints today.        The 10-year ASCVD risk score (Amari GUDINO, et al., 2019) is: 7.4%    Values used to calculate the score:      Age: 53 years      Sex: Male      Is Non- : No      Diabetic: No      Tobacco smoker: No      Systolic Blood Pressure: 126 mmHg      Is BP treated: No      HDL Cholesterol: 46 mg/dL      Total Cholesterol: 246 mg/dL      HPI  Review of Systems   Constitutional:  Negative for activity change and appetite change.   HENT:  Negative for congestion, postnasal drip, rhinorrhea and sinus pressure.    Eyes:  Negative for pain and redness.   Respiratory:  Negative for choking and chest tightness.    Gastrointestinal:  Negative for abdominal distention, abdominal pain, blood in stool, constipation, diarrhea, nausea and vomiting.   Endocrine: Negative for polydipsia and polyphagia.   Genitourinary:  Negative for dysuria and hematuria.   Musculoskeletal:  Negative for arthralgias and myalgias.   Skin:  Negative for color change and rash.   Neurological:  Negative for dizziness and headaches.   Psychiatric/Behavioral:  Negative for agitation and behavioral problems.        Past medical, surgical, family and social history reviewed.  Objective:     Vitals:    08/30/23 0809   BP: 126/76   Pulse: 60   Resp: 20   Temp: 98 °F (36.7 °C)   SpO2: 96%   Weight: 84.4 kg (185 lb 15.3 oz)   Height: 5' 8" (1.727 m)   PainSc:   6   PainLoc: Hand     Body mass index is 28.27 kg/m².     Physical Exam  Constitutional:       General: He is not in acute distress.     Appearance: He is well-developed. He is not diaphoretic.   HENT:      Head: Normocephalic and atraumatic.      Right Ear: Hearing, tympanic membrane, ear canal and external ear normal.      Left Ear: Hearing, tympanic membrane, ear canal " and external ear normal.      Nose: Nose normal.      Mouth/Throat:      Pharynx: Uvula midline.   Eyes:      General:         Right eye: No discharge.         Left eye: No discharge.      Conjunctiva/sclera: Conjunctivae normal.      Pupils: Pupils are equal, round, and reactive to light.   Neck:      Thyroid: No thyromegaly.      Vascular: No carotid bruit or JVD.      Trachea: Trachea normal.   Cardiovascular:      Rate and Rhythm: Normal rate and regular rhythm.      Heart sounds: No murmur heard.     No friction rub. No gallop.   Pulmonary:      Effort: Pulmonary effort is normal. No respiratory distress.      Breath sounds: Normal breath sounds. No wheezing or rales.   Chest:      Chest wall: No tenderness.   Abdominal:      General: Bowel sounds are normal. There is no distension.      Palpations: Abdomen is soft. There is no mass.      Tenderness: There is no abdominal tenderness. There is no guarding or rebound.   Musculoskeletal:         General: Normal range of motion.      Cervical back: Normal range of motion and neck supple.   Skin:     General: Skin is warm and dry.   Neurological:      Mental Status: He is alert and oriented to person, place, and time.      Coordination: Coordination normal.   Psychiatric:         Behavior: Behavior normal.         Thought Content: Thought content normal.         Judgment: Judgment normal.         Assessment:       1. Routine physical examination    2. Immunization due    3. Polyp of colon, unspecified part of colon, unspecified type    4. Arthritis        Plan:       Jimmy was seen today for annual exam.    Diagnoses and all orders for this visit:    Routine physical examination    Immunization due  -     (In Office Administered) Pneumococcal Conjugate Vaccine (20 Valent) (IM)    Polyp of colon, unspecified part of colon, unspecified type  -     Case Request Endoscopy: COLONOSCOPY    Arthritis    Other orders  -     diclofenac-misoprostol  mg-mcg (ARTHROTEC  75)  mg-mcg per tablet; Take 1 tablet by mouth 2 (two) times daily as needed (arthritis).

## 2023-10-26 ENCOUNTER — TELEPHONE (OUTPATIENT)
Dept: GASTROENTEROLOGY | Facility: CLINIC | Age: 53
End: 2023-10-26
Payer: COMMERCIAL

## 2023-11-22 ENCOUNTER — TELEPHONE (OUTPATIENT)
Dept: GASTROENTEROLOGY | Facility: CLINIC | Age: 53
End: 2023-11-22
Payer: COMMERCIAL

## 2023-11-22 NOTE — TELEPHONE ENCOUNTER
Spoke to pt and C-scope scheduled. Prep instructions sent to pts mychart. Pt verbalized understanding

## 2024-01-23 ENCOUNTER — OFFICE VISIT (OUTPATIENT)
Dept: FAMILY MEDICINE | Facility: CLINIC | Age: 54
End: 2024-01-23
Payer: COMMERCIAL

## 2024-01-23 VITALS
SYSTOLIC BLOOD PRESSURE: 126 MMHG | TEMPERATURE: 98 F | HEART RATE: 66 BPM | OXYGEN SATURATION: 97 % | BODY MASS INDEX: 29.47 KG/M2 | WEIGHT: 194.44 LBS | HEIGHT: 68 IN | RESPIRATION RATE: 18 BRPM | DIASTOLIC BLOOD PRESSURE: 78 MMHG

## 2024-01-23 DIAGNOSIS — J32.9 SINUSITIS, UNSPECIFIED CHRONICITY, UNSPECIFIED LOCATION: Primary | ICD-10-CM

## 2024-01-23 DIAGNOSIS — F32.A DEPRESSION, UNSPECIFIED DEPRESSION TYPE: ICD-10-CM

## 2024-01-23 DIAGNOSIS — F41.9 ANXIETY: ICD-10-CM

## 2024-01-23 DIAGNOSIS — K21.9 GASTROESOPHAGEAL REFLUX DISEASE, UNSPECIFIED WHETHER ESOPHAGITIS PRESENT: ICD-10-CM

## 2024-01-23 PROCEDURE — 3008F BODY MASS INDEX DOCD: CPT | Mod: CPTII,S$GLB,, | Performed by: NURSE PRACTITIONER

## 2024-01-23 PROCEDURE — 3078F DIAST BP <80 MM HG: CPT | Mod: CPTII,S$GLB,, | Performed by: NURSE PRACTITIONER

## 2024-01-23 PROCEDURE — 99214 OFFICE O/P EST MOD 30 MIN: CPT | Mod: S$GLB,,, | Performed by: NURSE PRACTITIONER

## 2024-01-23 PROCEDURE — 1159F MED LIST DOCD IN RCRD: CPT | Mod: CPTII,S$GLB,, | Performed by: NURSE PRACTITIONER

## 2024-01-23 PROCEDURE — 3074F SYST BP LT 130 MM HG: CPT | Mod: CPTII,S$GLB,, | Performed by: NURSE PRACTITIONER

## 2024-01-23 RX ORDER — OMEPRAZOLE 20 MG/1
20 CAPSULE, DELAYED RELEASE ORAL DAILY
COMMUNITY
End: 2024-01-23

## 2024-01-23 RX ORDER — GUAIFENESIN AND PHENYLEPHRINE HCL 400; 10 MG/1; MG/1
1000 TABLET ORAL DAILY
COMMUNITY

## 2024-01-23 RX ORDER — PANTOPRAZOLE SODIUM 40 MG/1
40 TABLET, DELAYED RELEASE ORAL DAILY
Qty: 90 TABLET | Refills: 3 | Status: SHIPPED | OUTPATIENT
Start: 2024-01-23 | End: 2025-01-22

## 2024-01-23 RX ORDER — AMOXICILLIN AND CLAVULANATE POTASSIUM 875; 125 MG/1; MG/1
1 TABLET, FILM COATED ORAL 2 TIMES DAILY
Qty: 14 TABLET | Refills: 0 | Status: SHIPPED | OUTPATIENT
Start: 2024-01-23 | End: 2024-01-30

## 2024-01-23 RX ORDER — IBUPROFEN 100 MG/5ML
1000 SUSPENSION, ORAL (FINAL DOSE FORM) ORAL DAILY
COMMUNITY
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
## 2024-02-02 ENCOUNTER — TELEPHONE (OUTPATIENT)
Dept: GASTROENTEROLOGY | Facility: CLINIC | Age: 54
End: 2024-02-02
Payer: COMMERCIAL

## 2024-02-02 NOTE — TELEPHONE ENCOUNTER
----- Message from Rosalva Ashley RN sent at 2/2/2024  9:19 AM CST -----  Contact: Pt Wife    ----- Message -----  From: Gerardo Benitez, Patient Care Assistant  Sent: 2/2/2024   8:47 AM CST  To: Hayes Britton Staff    Type: Needs Medical Advice    Who Called: Pt Wife  Best Call Back Number: [phone]  Inquiry/Question: Wife is calling to cancel pt's procedure. Please advise Thank you~

## 2024-04-12 ENCOUNTER — OFFICE VISIT (OUTPATIENT)
Dept: OPTOMETRY | Facility: CLINIC | Age: 54
End: 2024-04-12
Payer: COMMERCIAL

## 2024-04-12 DIAGNOSIS — H11.001 PTERYGIUM OF RIGHT EYE: Primary | ICD-10-CM

## 2024-04-12 DIAGNOSIS — H52.7 REFRACTIVE ERROR: ICD-10-CM

## 2024-04-12 PROCEDURE — 92014 COMPRE OPH EXAM EST PT 1/>: CPT | Mod: S$GLB,,, | Performed by: OPTOMETRIST

## 2024-04-12 PROCEDURE — 99999 PR PBB SHADOW E&M-EST. PATIENT-LVL III: CPT | Mod: PBBFAC,,, | Performed by: OPTOMETRIST

## 2024-04-12 PROCEDURE — 1159F MED LIST DOCD IN RCRD: CPT | Mod: CPTII,S$GLB,, | Performed by: OPTOMETRIST

## 2024-04-12 PROCEDURE — 1160F RVW MEDS BY RX/DR IN RCRD: CPT | Mod: CPTII,S$GLB,, | Performed by: OPTOMETRIST

## 2024-04-12 PROCEDURE — 92015 DETERMINE REFRACTIVE STATE: CPT | Mod: S$GLB,,, | Performed by: OPTOMETRIST

## 2024-04-12 NOTE — PROGRESS NOTES
HPI     Eye Problem     Additional comments: Blur ou at dist/near, x mos, no assoc pain or red,   no relief over time, constant           Comments    Pt here for complete exam with the complaint of blurred VA at both near   and distance. Denies flashes and floaters, no gtt. Pt does have a   pterygium of OD. Pt states difficulty with night vision. Pt states   constant tears and discharge of OD.          Last edited by Dawson Hayward, OD on 4/12/2024 11:35 AM.            Assessment /Plan     For exam results, see Encounter Report.    Pterygium of right eye    Refractive error      Recommend artificial tears and sunglass wear for prevention of growth, Monitor condition. Patient to report any changes. RTC 1 year recheck.  2. New Spectacle Rx given, discussed different options for glasses. RTC 1 year routine eye exam.

## 2024-05-29 ENCOUNTER — PATIENT MESSAGE (OUTPATIENT)
Dept: FAMILY MEDICINE | Facility: CLINIC | Age: 54
End: 2024-05-29
Payer: COMMERCIAL

## 2024-05-30 RX ORDER — AMOXICILLIN 875 MG/1
875 TABLET, FILM COATED ORAL EVERY 12 HOURS
Qty: 14 TABLET | Refills: 0 | Status: SHIPPED | OUTPATIENT
Start: 2024-05-30 | End: 2024-06-06

## 2024-07-02 ENCOUNTER — OFFICE VISIT (OUTPATIENT)
Dept: ORTHOPEDICS | Facility: CLINIC | Age: 54
End: 2024-07-02
Payer: COMMERCIAL

## 2024-07-02 VITALS — WEIGHT: 185 LBS | BODY MASS INDEX: 28.04 KG/M2 | HEIGHT: 68 IN

## 2024-07-02 DIAGNOSIS — K21.9 GASTROESOPHAGEAL REFLUX DISEASE, UNSPECIFIED WHETHER ESOPHAGITIS PRESENT: ICD-10-CM

## 2024-07-02 DIAGNOSIS — M17.11 PRIMARY OSTEOARTHRITIS OF RIGHT KNEE: Primary | ICD-10-CM

## 2024-07-02 PROCEDURE — 1160F RVW MEDS BY RX/DR IN RCRD: CPT | Mod: CPTII,S$GLB,, | Performed by: ORTHOPAEDIC SURGERY

## 2024-07-02 PROCEDURE — 20610 DRAIN/INJ JOINT/BURSA W/O US: CPT | Mod: RT,S$GLB,, | Performed by: ORTHOPAEDIC SURGERY

## 2024-07-02 PROCEDURE — 99999 PR PBB SHADOW E&M-EST. PATIENT-LVL III: CPT | Mod: PBBFAC,,, | Performed by: ORTHOPAEDIC SURGERY

## 2024-07-02 PROCEDURE — 3008F BODY MASS INDEX DOCD: CPT | Mod: CPTII,S$GLB,, | Performed by: ORTHOPAEDIC SURGERY

## 2024-07-02 PROCEDURE — 1159F MED LIST DOCD IN RCRD: CPT | Mod: CPTII,S$GLB,, | Performed by: ORTHOPAEDIC SURGERY

## 2024-07-02 PROCEDURE — 99203 OFFICE O/P NEW LOW 30 MIN: CPT | Mod: 25,S$GLB,, | Performed by: ORTHOPAEDIC SURGERY

## 2024-07-02 RX ORDER — PANTOPRAZOLE SODIUM 40 MG/1
40 TABLET, DELAYED RELEASE ORAL DAILY
Qty: 90 TABLET | Refills: 3 | Status: SHIPPED | OUTPATIENT
Start: 2024-07-02 | End: 2025-07-02

## 2024-07-02 RX ORDER — MELOXICAM 15 MG/1
15 TABLET ORAL DAILY
Qty: 30 TABLET | Refills: 11 | Status: SHIPPED | OUTPATIENT
Start: 2024-07-02

## 2024-07-02 RX ORDER — METHOCARBAMOL 750 MG/1
750 TABLET, FILM COATED ORAL 4 TIMES DAILY PRN
Qty: 44 TABLET | Refills: 3 | Status: SHIPPED | OUTPATIENT
Start: 2024-07-02

## 2024-07-02 RX ADMIN — TRIAMCINOLONE ACETONIDE 40 MG: 40 INJECTION, SUSPENSION INTRA-ARTICULAR; INTRAMUSCULAR at 03:07

## 2024-07-09 RX ORDER — TRIAMCINOLONE ACETONIDE 40 MG/ML
40 INJECTION, SUSPENSION INTRA-ARTICULAR; INTRAMUSCULAR
Status: DISCONTINUED | OUTPATIENT
Start: 2024-07-02 | End: 2024-07-09 | Stop reason: HOSPADM

## 2024-07-09 NOTE — PROGRESS NOTES
Chief Complaint   Patient presents with    Right Knee - Pain         HPI:   This is a 53 y.o. who presents to clinic today complaining of right knee pain for 2 months after no known trauma. Pain is progressively worsening. No numbness or tingling. No associated signs or symptoms.    Past Medical History:   Diagnosis Date    Anxiety     Colon polyps     GERD (gastroesophageal reflux disease)     Kidney stones     IRVING (obstructive sleep apnea)     No CPAP     Past Surgical History:   Procedure Laterality Date    CARDIAC CATHETERIZATION  2014    NORMAL-Dr Homer - angiogram    COLONOSCOPY  06/29/2016    Repeat in 2021    COLONOSCOPY N/A 6/29/2016    Procedure: COLONOSCOPY;  Surgeon: Bony Ramirez MD;  Location: [facility] END[facility];  Service: Endoscopy;  Laterality: N/A;    eye syrgery      VASECTOMY       Current Outpatient Medications on File Prior to Visit   Medication Sig Dispense Refill    albuterol (PROVENTIL HFA) 90 mcg/actuation inhaler Inhale 1 puff into the lungs every 6 (six) hours as needed for Wheezing (wheezing and congestion). Rescue 8.5 g 2    ascorbic acid, vitamin C, (VITAMIN C) 1000 MG tablet Take 1,000 mg by mouth once daily.      ELDERBERRY FRUIT ORAL Take by mouth.      turmeric root extract 500 mg Cap Take 1,000 mg by mouth once daily.       No current facility-administered medications on file prior to visit.     Review of patient's allergies indicates:  No Known Allergies  Family History   Problem Relation Name Age of Onset    Lung cancer Mother      Cirrhosis Paternal Grandmother          no etoh, unknown    Glaucoma Neg Hx      Macular degeneration Neg Hx      Retinal detachment Neg Hx       Social History     Socioeconomic History    Marital status:    Tobacco Use    Smoking status: Never    Smokeless tobacco: Current     Types: Snuff    Tobacco comments:     snuff   Substance and Sexual Activity    Alcohol use: Yes     Alcohol/week: 15.0 standard drinks of alcohol     Types: 15 Cans of beer  per week    Drug use: No    Sexual activity: Yes     Partners: Female       Review of Systems:  Constitutional:  Denies fever or chills   Eyes:  Denies change in visual acuity   HENT:  Denies nasal congestion or sore throat   Respiratory:  Denies cough or shortness of breath   Cardiovascular:  Denies chest pain or edema   GI:  Denies abdominal pain, nausea, vomiting, bloody stools or diarrhea   :  Denies dysuria   Integument:  Denies rash   Neurologic:  Denies headache, focal weakness or sensory changes   Endocrine:  Denies polyuria or polydipsia   Lymphatic:  Denies swollen glands   Psychiatric:  Denies depression or anxiety     Physical Exam:   Constitutional:  Well developed, well nourished, no acute distress, non-toxic appearance   Integument:  Well hydrated, no rash   Lymphatic:  No lymphadenopathy noted   Neurologic:  Alert & oriented x 3, CN 2-12 normal, normal motor function, normal sensory function, no focal deficits noted   Psychiatric:  Speech and behavior appropriate   Eyes: EOMI  Gi: abdomen soft    Bilateral Knee Exam    right Knee Exam     Tenderness   The patient is experiencing tenderness in the medial joint line.    Range of Motion   Extension: abnormal   Flexion: abnormal     Muscle Strength     The patient has normal knee strength.    Tests   Jami:  Medial - positive   Lachman:  Anterior - negative      Varus: negative  Valgus: negative  Patellar Apprehension: negative    Other   Erythema: absent  Sensation: normal  Pulse: present  Swelling: mild      left Knee Exam   left knee exam performed same as contralateral side and is normal.            X-rays were performed, personally reviewed by me and findings discussed with the patient.  3 views of the right knee show tricompartmental degenerative change most pronounced in the medial compartment with Kellgren 3 changes    Primary osteoarthritis of right knee    Gastroesophageal reflux disease, unspecified whether esophagitis present  -      pantoprazole (PROTONIX) 40 MG tablet; Take 1 tablet (40 mg total) by mouth once daily.  Dispense: 90 tablet; Refill: 3    Other orders  -     methocarbamoL (ROBAXIN) 750 MG Tab; Take 1 tablet (750 mg total) by mouth 4 (four) times daily as needed.  Dispense: 44 tablet; Refill: 3  -     meloxicam (MOBIC) 15 MG tablet; Take 1 tablet (15 mg total) by mouth once daily.  Dispense: 30 tablet; Refill: 11            Using an aseptic technique, I injected 5 cc of lidocaine 1% without and 1 cc of kenalog 40mg into the right Knee. The patient tolerated this well. I will have them return to clinic as needed.

## 2024-07-09 NOTE — PROCEDURES
Large Joint Aspiration/Injection: R knee    Date/Time: 7/2/2024 3:30 PM    Performed by: Darrell Canales MD  Authorized by: Darrell Canales MD    Consent Done?:  Yes (Verbal)  Indications:  Pain  Timeout: prior to procedure the correct patient, procedure, and site was verified    Prep: patient was prepped and draped in usual sterile fashion    Local anesthetic:  Lidocaine 1% without epinephrine  Anesthetic total (ml):  5      Details:  Needle Size:  21 G  Approach:  Anterolateral  Location:  Knee  Site:  R knee  Medications:  40 mg triamcinolone acetonide 40 mg/mL  Patient tolerance:  Patient tolerated the procedure well with no immediate complications

## 2024-10-02 ENCOUNTER — PATIENT MESSAGE (OUTPATIENT)
Dept: FAMILY MEDICINE | Facility: CLINIC | Age: 54
End: 2024-10-02
Payer: COMMERCIAL

## 2024-10-03 RX ORDER — AMOXICILLIN 875 MG/1
875 TABLET, FILM COATED ORAL EVERY 12 HOURS
Qty: 14 TABLET | Refills: 0 | Status: SHIPPED | OUTPATIENT
Start: 2024-10-03 | End: 2024-10-10

## 2024-11-08 NOTE — PROGRESS NOTES
HPI     Urgent OU     Pt states he has been dealing with Styes and Chalazion for the past 2   years. Pt states once one goes down another one comes up. He has gotten 3   or 4 removed. Pt states his eyes constantly hurt. He also has a Pterygium   in OD that keeps growing. Pt uses lid scrubs and baby shampoo as well as   Lumify on occasion      Last edited by Jignesh Petersen on 1/23/2020  1:47 PM. (History)            Assessment /Plan     For exam results, see Encounter Report.    Chalazion of right upper eyelid  -     tobramycin-dexamethasone 0.3-0.1% (TOBRADEX) 0.3-0.1 % DrpS; Place 1 drop into both eyes 4 (four) times daily. for 7 days  Dispense: 2.5 mL; Refill: 1    Squamous blepharitis of upper and lower eyelids of both eyes    Meibomitis, unspecified laterality    Pterygium of right eye    Rosacea      1,2,3. Cont with warm compress, use Lashaun mask, ocusoft plus lid wipes, start Tobradex drops 4x/day x 1 week to eyelids and eye,   4. Use long term lubricants after done with steroids.   5. Cont with metrogel, also given doxy.               5 Aba

## 2024-11-30 ENCOUNTER — PATIENT MESSAGE (OUTPATIENT)
Dept: FAMILY MEDICINE | Facility: CLINIC | Age: 54
End: 2024-11-30
Payer: COMMERCIAL

## 2024-11-30 DIAGNOSIS — Z12.5 PROSTATE CANCER SCREENING: ICD-10-CM

## 2024-11-30 DIAGNOSIS — Z00.00 LABORATORY EXAM ORDERED AS PART OF ROUTINE GENERAL MEDICAL EXAMINATION: Primary | ICD-10-CM

## 2024-12-02 NOTE — TELEPHONE ENCOUNTER
Pt is requesting orders for blood work before he comes in for his appointment. Please add any if needed.

## 2024-12-12 ENCOUNTER — LAB VISIT (OUTPATIENT)
Dept: LAB | Facility: HOSPITAL | Age: 54
End: 2024-12-12
Payer: COMMERCIAL

## 2024-12-12 DIAGNOSIS — Z00.00 LABORATORY EXAM ORDERED AS PART OF ROUTINE GENERAL MEDICAL EXAMINATION: ICD-10-CM

## 2024-12-12 DIAGNOSIS — Z12.5 PROSTATE CANCER SCREENING: ICD-10-CM

## 2024-12-12 LAB
ALBUMIN SERPL BCP-MCNC: 3.7 G/DL (ref 3.5–5.2)
ALP SERPL-CCNC: 114 U/L (ref 40–150)
ALT SERPL W/O P-5'-P-CCNC: 35 U/L (ref 10–44)
ANION GAP SERPL CALC-SCNC: 9 MMOL/L (ref 8–16)
AST SERPL-CCNC: 28 U/L (ref 10–40)
BASOPHILS # BLD AUTO: 0.06 K/UL (ref 0–0.2)
BASOPHILS NFR BLD: 0.9 % (ref 0–1.9)
BILIRUB SERPL-MCNC: 1.5 MG/DL (ref 0.1–1)
BUN SERPL-MCNC: 13 MG/DL (ref 6–20)
CALCIUM SERPL-MCNC: 9 MG/DL (ref 8.7–10.5)
CHLORIDE SERPL-SCNC: 106 MMOL/L (ref 95–110)
CHOLEST SERPL-MCNC: 250 MG/DL (ref 120–199)
CHOLEST/HDLC SERPL: 6.3 {RATIO} (ref 2–5)
CO2 SERPL-SCNC: 25 MMOL/L (ref 23–29)
COMPLEXED PSA SERPL-MCNC: 0.57 NG/ML (ref 0–4)
CREAT SERPL-MCNC: 1.1 MG/DL (ref 0.5–1.4)
DIFFERENTIAL METHOD BLD: ABNORMAL
EOSINOPHIL # BLD AUTO: 0.1 K/UL (ref 0–0.5)
EOSINOPHIL NFR BLD: 1.9 % (ref 0–8)
ERYTHROCYTE [DISTWIDTH] IN BLOOD BY AUTOMATED COUNT: 12.4 % (ref 11.5–14.5)
EST. GFR  (NO RACE VARIABLE): >60 ML/MIN/1.73 M^2
ESTIMATED AVG GLUCOSE: 105 MG/DL (ref 68–131)
GLUCOSE SERPL-MCNC: 80 MG/DL (ref 70–110)
HBA1C MFR BLD: 5.3 % (ref 4–5.6)
HCT VFR BLD AUTO: 49.4 % (ref 40–54)
HDLC SERPL-MCNC: 40 MG/DL (ref 40–75)
HDLC SERPL: 16 % (ref 20–50)
HGB BLD-MCNC: 16.8 G/DL (ref 14–18)
IMM GRANULOCYTES # BLD AUTO: 0.07 K/UL (ref 0–0.04)
IMM GRANULOCYTES NFR BLD AUTO: 1 % (ref 0–0.5)
LDLC SERPL CALC-MCNC: 158.6 MG/DL (ref 63–159)
LYMPHOCYTES # BLD AUTO: 1.7 K/UL (ref 1–4.8)
LYMPHOCYTES NFR BLD: 25.6 % (ref 18–48)
MCH RBC QN AUTO: 29.8 PG (ref 27–31)
MCHC RBC AUTO-ENTMCNC: 34 G/DL (ref 32–36)
MCV RBC AUTO: 88 FL (ref 82–98)
MONOCYTES # BLD AUTO: 0.7 K/UL (ref 0.3–1)
MONOCYTES NFR BLD: 10 % (ref 4–15)
NEUTROPHILS # BLD AUTO: 4 K/UL (ref 1.8–7.7)
NEUTROPHILS NFR BLD: 60.6 % (ref 38–73)
NONHDLC SERPL-MCNC: 210 MG/DL
NRBC BLD-RTO: 0 /100 WBC
PLATELET # BLD AUTO: 252 K/UL (ref 150–450)
PMV BLD AUTO: 10.7 FL (ref 9.2–12.9)
POTASSIUM SERPL-SCNC: 4.2 MMOL/L (ref 3.5–5.1)
PROT SERPL-MCNC: 6.9 G/DL (ref 6–8.4)
RBC # BLD AUTO: 5.64 M/UL (ref 4.6–6.2)
SODIUM SERPL-SCNC: 140 MMOL/L (ref 136–145)
TRIGL SERPL-MCNC: 257 MG/DL (ref 30–150)
TSH SERPL DL<=0.005 MIU/L-ACNC: 1.82 UIU/ML (ref 0.4–4)
WBC # BLD AUTO: 6.67 K/UL (ref 3.9–12.7)

## 2024-12-12 PROCEDURE — 85025 COMPLETE CBC W/AUTO DIFF WBC: CPT | Performed by: NURSE PRACTITIONER

## 2024-12-12 PROCEDURE — 84443 ASSAY THYROID STIM HORMONE: CPT | Performed by: NURSE PRACTITIONER

## 2024-12-12 PROCEDURE — 80053 COMPREHEN METABOLIC PANEL: CPT | Performed by: NURSE PRACTITIONER

## 2024-12-12 PROCEDURE — 83036 HEMOGLOBIN GLYCOSYLATED A1C: CPT | Performed by: NURSE PRACTITIONER

## 2024-12-12 PROCEDURE — 80061 LIPID PANEL: CPT | Performed by: NURSE PRACTITIONER

## 2024-12-12 PROCEDURE — 84153 ASSAY OF PSA TOTAL: CPT | Performed by: NURSE PRACTITIONER

## 2024-12-12 PROCEDURE — 36415 COLL VENOUS BLD VENIPUNCTURE: CPT | Mod: PO | Performed by: NURSE PRACTITIONER

## 2024-12-12 NOTE — PROGRESS NOTES
I have released your blood work.  We will review this in detail at our upcoming visit.  Have a great day.    Elena Ureña [facility]  [address]  [place] [address]  [phone] (phone)  [phone] (fax)

## 2024-12-16 ENCOUNTER — OFFICE VISIT (OUTPATIENT)
Dept: FAMILY MEDICINE | Facility: CLINIC | Age: 54
End: 2024-12-16
Payer: COMMERCIAL

## 2024-12-16 VITALS
HEIGHT: 68 IN | WEIGHT: 191.13 LBS | HEART RATE: 76 BPM | BODY MASS INDEX: 28.97 KG/M2 | OXYGEN SATURATION: 96 % | RESPIRATION RATE: 18 BRPM | TEMPERATURE: 98 F | SYSTOLIC BLOOD PRESSURE: 110 MMHG | DIASTOLIC BLOOD PRESSURE: 78 MMHG

## 2024-12-16 DIAGNOSIS — E78.5 HYPERLIPIDEMIA, UNSPECIFIED HYPERLIPIDEMIA TYPE: ICD-10-CM

## 2024-12-16 DIAGNOSIS — J02.9 SORE THROAT: ICD-10-CM

## 2024-12-16 DIAGNOSIS — K21.9 GASTROESOPHAGEAL REFLUX DISEASE, UNSPECIFIED WHETHER ESOPHAGITIS PRESENT: ICD-10-CM

## 2024-12-16 DIAGNOSIS — M54.12 CERVICAL RADICULOPATHY: ICD-10-CM

## 2024-12-16 DIAGNOSIS — J06.9 VIRAL URI: ICD-10-CM

## 2024-12-16 DIAGNOSIS — M54.9 DORSALGIA, UNSPECIFIED: Primary | ICD-10-CM

## 2024-12-16 LAB
CTP QC/QA: YES
POC MOLECULAR INFLUENZA A AGN: NEGATIVE
POC MOLECULAR INFLUENZA B AGN: NEGATIVE

## 2024-12-16 PROCEDURE — 3078F DIAST BP <80 MM HG: CPT | Mod: CPTII,S$GLB,, | Performed by: NURSE PRACTITIONER

## 2024-12-16 PROCEDURE — 87502 INFLUENZA DNA AMP PROBE: CPT | Mod: QW,,, | Performed by: NURSE PRACTITIONER

## 2024-12-16 PROCEDURE — 3074F SYST BP LT 130 MM HG: CPT | Mod: CPTII,S$GLB,, | Performed by: NURSE PRACTITIONER

## 2024-12-16 PROCEDURE — 1159F MED LIST DOCD IN RCRD: CPT | Mod: CPTII,S$GLB,, | Performed by: NURSE PRACTITIONER

## 2024-12-16 PROCEDURE — 99214 OFFICE O/P EST MOD 30 MIN: CPT | Mod: S$GLB,,, | Performed by: NURSE PRACTITIONER

## 2024-12-16 PROCEDURE — 3008F BODY MASS INDEX DOCD: CPT | Mod: CPTII,S$GLB,, | Performed by: NURSE PRACTITIONER

## 2024-12-16 PROCEDURE — 3044F HG A1C LEVEL LT 7.0%: CPT | Mod: CPTII,S$GLB,, | Performed by: NURSE PRACTITIONER

## 2024-12-16 PROCEDURE — G2211 COMPLEX E/M VISIT ADD ON: HCPCS | Mod: S$GLB,,, | Performed by: NURSE PRACTITIONER

## 2024-12-16 NOTE — PROGRESS NOTES
Patient ID: Jimmy Elaine Jr. is a 54 y.o. male.     History of Present Illness    CHIEF COMPLAINT:  Patient presents today with pain in the right calf, neck, and right arm.    MUSCULOSKELETAL PAIN:  He reports right calf pain described as a burning sensation, likened to heat from a blow dryer. He has neck pain with two herniated discs, with radiation from neck down to shoulder and right arm. He experiences right arm weakness, which is significantly diminished compared to his left arm strength. Physical therapy for neck pain, including dry needling, was attempted without improvement.    IMAGING HISTORY:  MRI in 2018 showed cervical stenosis. A previous MRI from 5-6 years ago revealed a herniated cervical disc. X-rays were taken by a chiropractor.    UPPER RESPIRATORY SYMPTOMS:  He reports 2-day history of scratchy throat, throat redness, sneezing, and sinus problems. Family + flu    LABS:  Thyroid level 1.8 (normal). Cholesterol elevated but improving. A1c 5.3, ruling out diabetes and pre-diabetes. Kidney, liver, and electrolytes normal. Triglycerides elevated.    MEDICATIONS:  He has received knee injections.        ROS:  General: -fever, -chills, -fatigue, -weight gain, -weight loss  Eyes: -vision changes, -redness, -discharge  ENT: -ear pain, +nasal congestion, -sore throat  Cardiovascular: -chest pain, -palpitations, -lower extremity edema  Respiratory: -cough, -shortness of breath  Gastrointestinal: -abdominal pain, -nausea, -vomiting, -diarrhea, -constipation, -blood in stool  Genitourinary: -dysuria, -hematuria, -frequency  Musculoskeletal: -joint pain, +muscle pain  Skin: -rash, -lesion  Neurological: -headache, -dizziness, -numbness, -tingling, +weakness  Psychiatric: -anxiety, -depression, -sleep difficulty  Allergic: +frequent sneezing         Physical Exam    Vitals:    12/16/24 1052   BP: 110/78   Pulse: 76   Resp: 18   Temp: 98.4 °F (36.9 °C)   TempSrc: Oral   SpO2: 96%   Weight: 86.7 kg (191 lb  "2.2 oz)   Height: 5' 8" (1.727 m)   PainSc:   4   PainLoc: Neck     Body mass index is 29.06 kg/m².  Physical Exam    General: No acute distress. Well-developed. Well-nourished.  Eyes. Sclerae anicteric.  HENT: Normocephalic. Atraumatic. Nares patent. Moist oral mucosa. Pharyngeal erythema.  Ears: Bilateral TMs clear. Bilateral EACs clear.  Cardiovascular: Regular rate. Regular rhythm. No murmurs. No rubs. No gallops. Normal S1, S2.  Respiratory: Normal respiratory effort. Clear to auscultation bilaterally. No rales. No rhonchi. No wheezing.  Abdomen: Soft. Non-tender. Non-distended. Normoactive bowel sounds.  Musculoskeletal: No  obvious deformity.  Extremities: No lower extremity edema.  Neurological: Alert & oriented x3. No slurred speech. Normal gait.  Psychiatric: Normal mood. Normal affect. Good insight. Good judgment.  Skin: Warm. Dry. No rash.                  Assessment & Plan    IMPRESSION:  - Evaluated complex pain presentation involving right calf, neck, right arm, and knees  - Reviewed recent chiropractic assessment suggesting weakness in right arm and potential need for neurosurgical evaluation  - Considered previous MRI findings from 5-6 years ago showing herniated disc in neck  - Assessed ineffectiveness of past physical therapy and dry needling for neck pain  - Analyzed recent labs results, noting normal thyroid, improved cholesterol levels, normal A1c, and normal kidney and liver function  - Determined elevated cholesterol and triglycerides likely related to recent dietary choices at [facility]    CERVICAL RADICULOPATHY AND NECK ISSUES:  - MRI ordered.  - Referred to Colby Pretty, neurosurgeon at [facility], for evaluation of neck and arm symptoms.    MIXED HYPERLIPIDEMIA:  - Explained labs results, including thyroid, cholesterol, A1c, kidney, liver, and blood cell counts.  - Discussed potential impact of recent dietary choices on triglyceride levels.  - Follow up in 6 months for cholesterol level " recheck.    GENERAL HEALTH:  - Continued elderberry supplement.       GERD  Continue PPI    VIRAL URI  Symptomatic treatment   FLU NEGATIVE    1. Dorsalgia, unspecified    2. Sore throat    3. Hyperlipidemia, unspecified hyperlipidemia type    4. Cervical radiculopathy    5. Gastroesophageal reflux disease, unspecified whether esophagitis present    6. Viral URI          I spent 30 minutes on this encounter, time includes face-to-face, chart review, documentation, test review and orders.       This note was generated with the assistance of ambient listening technology. Verbal consent was obtained by the patient and accompanying visitor(s) for the recording of patient appointment to facilitate this note. I attest to having reviewed and edited the generated note for accuracy, though some syntax or spelling errors may persist. Please contact the author of this note for any clarification.      Answers submitted by the patient for this visit:  Review of Systems Questionnaire (Submitted on 12/9/2024)  activity change: No  unexpected weight change: No  neck pain: Yes  hearing loss: No  rhinorrhea: No  trouble swallowing: No  eye discharge: No  visual disturbance: No  chest tightness: No  wheezing: No  chest pain: No  palpitations: No  blood in stool: No  constipation: No  vomiting: No  diarrhea: No  polydipsia: No  polyuria: No  difficulty urinating: No  urgency: No  hematuria: No  joint swelling: No  arthralgias: No  headaches: No  weakness: No  confusion: No  dysphoric mood: No

## 2024-12-23 ENCOUNTER — PATIENT MESSAGE (OUTPATIENT)
Dept: FAMILY MEDICINE | Facility: CLINIC | Age: 54
End: 2024-12-23
Payer: COMMERCIAL

## 2024-12-24 ENCOUNTER — TELEPHONE (OUTPATIENT)
Dept: FAMILY MEDICINE | Facility: CLINIC | Age: 54
End: 2024-12-24
Payer: COMMERCIAL

## 2024-12-24 ENCOUNTER — PATIENT MESSAGE (OUTPATIENT)
Dept: RADIOLOGY | Facility: HOSPITAL | Age: 54
End: 2024-12-24
Payer: COMMERCIAL

## 2024-12-24 DIAGNOSIS — M48.02 SPINAL STENOSIS, CERVICAL REGION: Primary | ICD-10-CM

## 2024-12-24 NOTE — TELEPHONE ENCOUNTER
----- Message from Jero sent at 12/24/2024  7:05 AM CST -----  Regarding: MRI Orders  Good morning, this patient was on the mri schedule last night, and refused exam stating that the order was incorrect. Mr Elaine stated the mri that was needed was his Cervical and Lumbar spine not just of his Lumbar. If someone could please reach out to the patient, explaining his orders so that he can be put back on the schedule that would be great. Thank you

## 2025-04-03 ENCOUNTER — PATIENT MESSAGE (OUTPATIENT)
Dept: FAMILY MEDICINE | Facility: CLINIC | Age: 55
End: 2025-04-03
Payer: COMMERCIAL

## 2025-04-03 ENCOUNTER — LAB VISIT (OUTPATIENT)
Dept: LAB | Facility: HOSPITAL | Age: 55
End: 2025-04-03
Attending: NURSE PRACTITIONER
Payer: COMMERCIAL

## 2025-04-03 DIAGNOSIS — M25.50 ARTHRALGIA, UNSPECIFIED JOINT: Primary | ICD-10-CM

## 2025-04-03 DIAGNOSIS — M25.50 ARTHRALGIA, UNSPECIFIED JOINT: ICD-10-CM

## 2025-04-03 LAB
CRP SERPL-MCNC: 0.9 MG/L
ERYTHROCYTE [SEDIMENTATION RATE] IN BLOOD BY PHOTOMETRIC METHOD: 8 MM/HR
RHEUMATOID FACT SERPL-ACNC: <13 IU/ML
URATE SERPL-MCNC: 6.4 MG/DL (ref 3.4–7)

## 2025-04-03 PROCEDURE — 86431 RHEUMATOID FACTOR QUANT: CPT

## 2025-04-03 PROCEDURE — 84550 ASSAY OF BLOOD/URIC ACID: CPT

## 2025-04-03 PROCEDURE — 36415 COLL VENOUS BLD VENIPUNCTURE: CPT | Mod: PO

## 2025-04-03 PROCEDURE — 86140 C-REACTIVE PROTEIN: CPT

## 2025-04-03 PROCEDURE — 85652 RBC SED RATE AUTOMATED: CPT

## 2025-04-04 ENCOUNTER — PATIENT MESSAGE (OUTPATIENT)
Dept: FAMILY MEDICINE | Facility: CLINIC | Age: 55
End: 2025-04-04
Payer: COMMERCIAL

## 2025-04-08 DIAGNOSIS — M54.2 CERVICALGIA: Primary | ICD-10-CM

## 2025-04-14 ENCOUNTER — TELEPHONE (OUTPATIENT)
Dept: NEUROSURGERY | Facility: CLINIC | Age: 55
End: 2025-04-14
Payer: COMMERCIAL

## 2025-04-14 NOTE — TELEPHONE ENCOUNTER
----- Message from Marilynpo sent at 4/14/2025  2:42 PM CDT -----  Type: Needs Medical AdviceWho Called:  pt's wife Terry Call Back Number: [phone] Information: Octavia is calling the office to see if an MRI is the only way to get imaging for the Dr to see the pt. The MRI deductible is $1000 and they cannot do that. The pt still would like to see the Dr as he was recommended by Dr Canales. Please call back to advise. Thanks!  ----- Message -----  From: Terri Meadows  Sent: 4/14/2025   2:46 PM CDT  To: Sukhwinder Bowman Staff    Type: Needs Medical AdviceWho Called:  pt's wifeBest Call Back Number: [phone] Information: pt's wife is calling the office to see if an MRI is the only way to get imaging for the Dr to see the pt. The MRI deductible is $1000 and they cannot do that. The pt still would like to see the Dr as he was recommended by Dr Canales. Please call back to advise. Thanks!

## 2025-04-15 ENCOUNTER — TELEPHONE (OUTPATIENT)
Dept: NEUROSURGERY | Facility: CLINIC | Age: 55
End: 2025-04-15
Payer: COMMERCIAL

## 2025-04-15 ENCOUNTER — HOSPITAL ENCOUNTER (OUTPATIENT)
Dept: RADIOLOGY | Facility: HOSPITAL | Age: 55
Discharge: HOME | End: 2025-04-15
Attending: ORTHOPAEDIC SURGERY
Payer: COMMERCIAL

## 2025-04-15 DIAGNOSIS — M54.2 CERVICALGIA: ICD-10-CM

## 2025-04-15 PROCEDURE — 72141 MRI NECK SPINE W/O DYE: CPT | Mod: 26,,, | Performed by: RADIOLOGY

## 2025-04-15 PROCEDURE — 72141 MRI NECK SPINE W/O DYE: CPT | Mod: TC,PO

## 2025-04-15 NOTE — TELEPHONE ENCOUNTER
----- Message from Dawson sent at 4/15/2025  8:50 AM CDT -----  Contact: Spouse  Type: Needs Medical AdviceWho Called:  Spouse/Terry Call Back Number: [phone] Information: Called to speak with office. States pt will have MRI this afternoon. Wanted to restore appt for 4/17 at 3:30

## 2025-04-17 ENCOUNTER — OFFICE VISIT (OUTPATIENT)
Dept: NEUROSURGERY | Facility: CLINIC | Age: 55
End: 2025-04-17
Payer: COMMERCIAL

## 2025-04-17 VITALS — RESPIRATION RATE: 18 BRPM | BODY MASS INDEX: 28.97 KG/M2 | HEIGHT: 68 IN | WEIGHT: 191.13 LBS

## 2025-04-17 DIAGNOSIS — G25.2 RESTING TREMOR: ICD-10-CM

## 2025-04-17 DIAGNOSIS — R25.3 FASCICULATIONS: ICD-10-CM

## 2025-04-17 DIAGNOSIS — M48.02 CERVICAL SPINAL STENOSIS: ICD-10-CM

## 2025-04-17 DIAGNOSIS — M50.30 DDD (DEGENERATIVE DISC DISEASE), CERVICAL: Primary | ICD-10-CM

## 2025-04-17 DIAGNOSIS — G56.21 CUBITAL TUNNEL SYNDROME ON RIGHT: ICD-10-CM

## 2025-04-17 NOTE — PROGRESS NOTES
Neurosurgery History & Physical    Patient ID: Jimmy Elaine Jr. is a 54 y.o. male.    No chief complaint on file.      History of Present Illness:   54 year old male  with IRVING, GERD who presents today for evaluation of  right arm issues. This has been ongoing for several years but increased over the last year.  I he has had some intermittent pains in the right side of his neck and right shoulder.  But this has managed with ibuprofen not really his biggest concern.  He feels weak with his right  despite this being his dominant side and drops things often. He denies pain in the right arm. He denies numbness/tingling.   He has a history of lateral epicondylitis and causes pain and limitations that he gets injections for    However he notes muscular fasciculations and he also notes involuntary tremors at rest.  He has had some can be quite impressive    He also notes a tremor in the right hand, appears to be with intention.     He has done therapy in the past without improvement.     Of note he has trigger finger and epicondylitis on the right, alleviated with injection.     Review of Systems  Constitutional: no fever, chills or night sweats. No changes in weight   Eyes: no visual changes   ENT: no nasal congestion or sore throat   Respiratory: no cough or shortness of breath   Cardiovascular: no chest pain or palpitations   Gastrointestinal: no nausea or vomiting   Genitourinary: no hematuria or dysuria   Integument/Breast: no rash or pruritis   Hematologic/Lymphatic: no easy bruising or lymphadenopathy   Musculoskeletal: no arthralgias or myalgias.   Neurological: no seizures or tremors   Behavioral/Psych: no auditory or visual hallucinations   Endocrine: no heat or cold intolerance     Past Medical History:   Diagnosis Date    Anxiety     Colon polyps     GERD (gastroesophageal reflux disease)     Kidney stones     IRVING (obstructive sleep apnea)     No CPAP     Social History[1]  Family History   Problem  "Relation Name Age of Onset    Lung cancer Mother      Cirrhosis Paternal Grandmother          no etoh, unknown    Glaucoma Neg Hx      Macular degeneration Neg Hx      Retinal detachment Neg Hx       Review of patient's allergies indicates:  No Known Allergies  Current Medications[2]  Resp. rate 18, height 5' 8" (1.727 m), weight 86.7 kg (191 lb 2.2 oz).      Neurological Exam  General: well developed, well nourished, no distress.   Neurologic: Alert and oriented. Thought content appropriate.  Cranial nerves: face symmetric, EOMI.   Motor Strength: Moves all extremities spontaneously with good tone. No abnormal movements seen.      Strength   Deltoids Triceps Biceps Wrist Extension Wrist Flexion Hand    Upper: R 5/5 5/5 5/5 5/5 5/5 5/5     L 5/5 5/5 5/5 5/5 5/5 5/5       Iliopsoas Quadriceps Knee  Flexion Tibialis  anterior Gastro- cnemius EHL   Lower: R 5/5 5/5 5/5 5/5 5/5 5/5     L 5/5 5/5 5/5 5/5 5/5 5/5      Sensory: intact to light touch throughout  DTR's - 1+ and symmetric in UE and LE  Herrera: present on right, mild  Clonus: absent  Pulm: Normal respiratory effort  Skin: Intact, no visible rashes or lesions     Positive Tinel    Imaging:   MRI cervical spine 4/15/25:  Impression:     1. Multilevel degenerative changes of the cervical spine, as detailed above, most pronounced at C6-7 and resulting in severe left and moderate right neural foraminal narrowing and moderate spinal canal stenosis with subtle mass effect on the ventral cord surface. No definite focal cord signal abnormality.  2. Mild spinal canal stenosis at C5-6.  No significant spinal canal stenosis at any other level.  3. Mild-to-moderate neural foraminal stenosis at C4-5, C5-6 and C7-T1.    Assessment & Plan:   54-year-old gentleman  Who has chief complaints include decreased  strength in the right hand  He has multiple considerations including lateral epicondylitis that we will flare-up and be severe at times  He has muscular " fasciculations an involuntary tremor    With regard to his cervical spine he has cervical degeneration moderate stenosis at C6-7 with a disc bulge indenting the thecal sac no cord signal change.  He has a central disc bulge at C5-6 indenting the thecal sac but not compressing the cord  He is not presenting with bilateral symptoms or typical cervical myelopathy  He has undergone physical therapy without significant improvement  We did discuss symptomatic treatment for his neck pain though he feels that this has minor and improved with ibuprofen        His cervical spine is unrelated to his fasciculations and tremor  Because of this I think he warrants further workup    We have ordered an EMG and nerve conduction study  And place a Neurology consult for fasciculations and tremor                                 [1]   Social History  Socioeconomic History    Marital status:    Tobacco Use    Smoking status: Never    Smokeless tobacco: Current     Types: Snuff    Tobacco comments:     snuff   Substance and Sexual Activity    Alcohol use: Yes     Alcohol/week: 15.0 standard drinks of alcohol     Types: 15 Cans of beer per week    Drug use: No    Sexual activity: Yes     Partners: Female     Social Drivers of Health     Financial Resource Strain: Low Risk  (12/9/2024)    Overall Financial Resource Strain (CARDIA)     Difficulty of Paying Living Expenses: Not hard at all   Food Insecurity: No Food Insecurity (12/9/2024)    Hunger Vital Sign     Worried About Running Out of Food in the Last Year: Never true     Ran Out of Food in the Last Year: Never true   Physical Activity: Sufficiently Active (12/9/2024)    Exercise Vital Sign     Days of Exercise per Week: 7 days     Minutes of Exercise per Session: 150+ min   Stress: No Stress Concern Present (12/9/2024)    [employer] - Occupational Stress Questionnaire     Feeling of Stress : Not at all   Housing Stability: Unknown (12/9/2024)     Housing Stability Vital Sign     Unable to Pay for Housing in the Last Year: No   [2]   Current Outpatient Medications:     albuterol (PROVENTIL HFA) 90 mcg/actuation inhaler, Inhale 1 puff into the lungs every 6 (six) hours as needed for Wheezing (wheezing and congestion). Rescue, Disp: 8.5 g, Rfl: 2    ascorbic acid, vitamin C, (VITAMIN C) 1000 MG tablet, Take 1,000 mg by mouth once daily., Disp: , Rfl:     ELDERBERRY FRUIT ORAL, Take by mouth., Disp: , Rfl:     meloxicam (MOBIC) 15 MG tablet, Take 1 tablet (15 mg total) by mouth once daily. (Patient not taking: Reported on 12/16/2024), Disp: 30 tablet, Rfl: 11    methocarbamoL (ROBAXIN) 750 MG Tab, Take 1 tablet (750 mg total) by mouth 4 (four) times daily as needed., Disp: 44 tablet, Rfl: 3    pantoprazole (PROTONIX) 40 MG tablet, Take 1 tablet (40 mg total) by mouth once daily., Disp: 90 tablet, Rfl: 3    turmeric root extract 500 mg Cap, Take 1,000 mg by mouth once daily., Disp: , Rfl:

## 2025-04-24 ENCOUNTER — PATIENT MESSAGE (OUTPATIENT)
Dept: NEUROSURGERY | Facility: CLINIC | Age: 55
End: 2025-04-24
Payer: COMMERCIAL

## 2025-04-24 ENCOUNTER — TELEPHONE (OUTPATIENT)
Dept: NEUROLOGY | Facility: CLINIC | Age: 55
End: 2025-04-24
Payer: COMMERCIAL

## 2025-04-25 ENCOUNTER — HOSPITAL ENCOUNTER (OUTPATIENT)
Dept: RADIOLOGY | Facility: HOSPITAL | Age: 55
Discharge: HOME | End: 2025-04-25
Attending: STUDENT IN AN ORGANIZED HEALTH CARE EDUCATION/TRAINING PROGRAM
Payer: COMMERCIAL

## 2025-04-25 DIAGNOSIS — M50.30 DDD (DEGENERATIVE DISC DISEASE), CERVICAL: ICD-10-CM

## 2025-04-25 DIAGNOSIS — M48.02 CERVICAL SPINAL STENOSIS: ICD-10-CM

## 2025-04-25 PROCEDURE — 72050 X-RAY EXAM NECK SPINE 4/5VWS: CPT | Mod: 26,,, | Performed by: RADIOLOGY

## 2025-04-25 PROCEDURE — 72050 X-RAY EXAM NECK SPINE 4/5VWS: CPT | Mod: TC,FY,PO

## 2025-05-05 ENCOUNTER — OFFICE VISIT (OUTPATIENT)
Dept: NEUROLOGY | Facility: CLINIC | Age: 55
End: 2025-05-05
Payer: COMMERCIAL

## 2025-05-05 VITALS
RESPIRATION RATE: 16 BRPM | BODY MASS INDEX: 29.43 KG/M2 | DIASTOLIC BLOOD PRESSURE: 77 MMHG | WEIGHT: 193.56 LBS | HEART RATE: 72 BPM | SYSTOLIC BLOOD PRESSURE: 121 MMHG

## 2025-05-05 DIAGNOSIS — R25.1 TREMOR: ICD-10-CM

## 2025-05-05 DIAGNOSIS — G25.2 RESTING TREMOR: ICD-10-CM

## 2025-05-05 DIAGNOSIS — M48.02 CERVICAL STENOSIS OF SPINAL CANAL: Primary | ICD-10-CM

## 2025-05-05 PROCEDURE — 1159F MED LIST DOCD IN RCRD: CPT | Mod: CPTII,S$GLB,, | Performed by: NURSE PRACTITIONER

## 2025-05-05 PROCEDURE — 99999 PR PBB SHADOW E&M-EST. PATIENT-LVL III: CPT | Mod: PBBFAC,,, | Performed by: NURSE PRACTITIONER

## 2025-05-05 PROCEDURE — 3078F DIAST BP <80 MM HG: CPT | Mod: CPTII,S$GLB,, | Performed by: NURSE PRACTITIONER

## 2025-05-05 PROCEDURE — 3074F SYST BP LT 130 MM HG: CPT | Mod: CPTII,S$GLB,, | Performed by: NURSE PRACTITIONER

## 2025-05-05 PROCEDURE — 3008F BODY MASS INDEX DOCD: CPT | Mod: CPTII,S$GLB,, | Performed by: NURSE PRACTITIONER

## 2025-05-05 PROCEDURE — 99204 OFFICE O/P NEW MOD 45 MIN: CPT | Mod: S$GLB,,, | Performed by: NURSE PRACTITIONER

## 2025-05-05 RX ORDER — NAPROXEN SODIUM 220 MG
220 TABLET ORAL
COMMUNITY

## 2025-05-05 NOTE — PROGRESS NOTES
"NEUROLOGY  Outpatient Consultation Visit     [facility]  [address], [place] [address]  [phone] (office) / [phone] (fax)    Patient Name:  Jimmy Elaine Jr.  :  1970  MR #:  [id number]  Acct #:  [id number]    Date of  Visit: 2025    Other Physicians:  Chitra Ureña NP (Primary Care Physician)      CHIEF COMPLAINT: Tremors        HISTORY OF PRESENT ILLNESS:  Jimmy Elaine Jr. is a 54 y.o. R-handed male seen in consultation for tremor per Colby Pretty MD    Medical history is significant for anxiety, HLD, cervical radiculopathy, IRVING    Pt reports 4-6 months of  intermittent RUE tremor   Works installing AC units -- he notes tightness / spasm in the R forearm when using tools, like he is about to get a cramp. His RUE tremors with certain postures mainly, he holds it close to core to help this and the pain.   Tremor not interfering with ADLs. After using his arm a lot, he does note worsening tremor, like fork shaking when he is eating. He notices that the shaking in his arm may be worse at night based on position.   He also feels a shaking sensation on the inside if he is resting   Feels weaker in the R arm, drops things often. Can't pull bow back due to pain and loss of strength   At night, wife wakes him up because his arm is "flopping around" so much that it wakes her up. Not sure which position triggers this. There is a portal message from her indicating that his RUE shakes when his head is turned to the R during sleep.   No tremor elsewhere  At times, he sees the muscles in the R arm jumping. None elsewhere.   Does have R sided neck pain. Recent neck imaging / evaluation by NSGY  Also has chronic issues with his RUE -- recurrent tendonitis / elbow pain, trigger finger -- treated with injections by ortho    Started magnesium at night to aid sleep and tremor seems less severe     The tremor really doesn't bother him. The pain in his arm is the " main concern.     No FH of tremor     No bulbar issues. No other weakness / atrophy. No sensory change. No gait change. No cognitive concerns. No change in bowel / bladder. No anosmia.     Allergies:  Review of patient's allergies indicates:  No Known Allergies    Current Medications:  Current Medications[1]    Past Medical History:  Past Medical History:   Diagnosis Date    Anxiety     Colon polyps     GERD (gastroesophageal reflux disease)     Kidney stones     IRVING (obstructive sleep apnea)     No CPAP       Past Surgical History:  Past Surgical History:   Procedure Laterality Date    CARDIAC CATHETERIZATION  2014    NORMAL-Dr Coronel - angiogram    COLONOSCOPY  06/29/2016    Repeat in 2021    COLONOSCOPY N/A 6/29/2016    Procedure: COLONOSCOPY;  Surgeon: Bony Ramirez MD;  Location: [facility];  Service: Endoscopy;  Laterality: N/A;    eye syrgery      VASECTOMY         Family History:  family history includes Cirrhosis in his paternal grandmother; Lung cancer in his mother.    Social History:   reports that he has never smoked. His smokeless tobacco use includes snuff. He reports current alcohol use of about 15.0 standard drinks of alcohol per week. He reports that he does not use drugs.      REVIEW OF SYSTEMS:  As per HPI    PHYSICAL EXAM:  /77 (BP Location: Right arm, Patient Position: Sitting)   Pulse 72   Resp 16   Wt 87.8 kg (193 lb 9 oz)   BMI 29.43 kg/m²     General: Well groomed. No acute distress.  Pulmonary: Normal effort and rate.   Musculoskeletal: No obvious joint deformities, moves all extremities well.  Extremities: No clubbing, cyanosis or edema.     Neurological Exam  Mental Status  Awake and alert. Oriented to person, place and time. Speech is normal. Language is fluent with no aphasia. Attention and concentration are normal. Fund of knowledge is appropriate for level of education.    Cranial Nerves  CN II: Right visual acuity: Finger movement. Left visual acuity: Finger movement.  Visual fields full to confrontation.  CN III, IV, VI: Extraocular movements intact bilaterally. Normal lids and orbits bilaterally. Pupils equal round and reactive to light bilaterally.  CN V: Facial sensation is normal.  CN VII: Full and symmetric facial movement.  CN VIII: Hearing is normal.  CN IX, X: Palate elevates symmetrically. Normal gag reflex.  CN XI: Shoulder shrug strength is normal.  CN XII: Tongue midline without atrophy or fasciculations.  Tongue appears to be tremoring, not clearly fasics .    Motor  Normal muscle bulk throughout. No fasciculations present. Normal muscle tone. No abnormal involuntary movements. Strength is 5/5 throughout all four extremities.    Sensory  Light touch is normal in upper and lower extremities. Vibration is normal in upper and lower extremities.     Reflexes                                            Right                      Left  Brachioradialis                    1+                         1+  Biceps                                 1+                         1+  Patellar                                1+                         1+    Right pathological reflexes: Muna's present. Ankle clonus absent.  Left pathological reflexes: Muna's absent. Ankle clonus absent.    Coordination  Right: Finger-to-nose normal. Rapid alternating movement normal.Left: Finger-to-nose normal. Rapid alternating movement normal.  Mild RUE tremor with action / intent, none at rest  RUE tremors worsens with arms outstretched, resolves with pressure on the R FA   No resting tremor .    Gait  Casual gait is normal including stance, stride, and arm swing. Romberg is absent. Able to rise from chair without using arms.        DIAGNOSTIC DATA:  I have personally reviewed provider notes, labs and imaging made available to me today.     Imaging:  Results for orders placed during the hospital encounter of 04/15/25    MRI Cervical Spine Without Contrast      Narrative  EXAMINATION:  MRI CERVICAL  SPINE WITHOUT CONTRAST    CLINICAL HISTORY:  Neck pain, chronic; Cervicalgia    TECHNIQUE:  Multiplanar, multisequence MR images of the cervical spine were performed without the administration of contrast.    COMPARISON:  MRI cervical spine 10/18/2018    FINDINGS:  Alignment: Normal.    Vertebral Column: Vertebral body heights are maintained. No acute fracture is identified; however, if trauma is suspected, a CT scan would be a more sensitive examination for fractures. No evidence of an aggressive marrow replacement process. Multilevel disc degeneration with disc desiccation at, anterior marginal osteophyte formation posterior disc osteophyte complexes, most pronounced at C6-7.    Cord: Stable chronic subtle mass effect on the ventral cord surface at C6-7 without definite focal cord signal abnormality.  Remainder of the cord demonstrates normal caliber and signal.    Skull base and craniocervical junction: Normal.    Disc levels:    C2-C3: Minimal posterior disc osteophyte complex.  Mild right facet arthropathy.   No significant neural foraminal or spinal canal stenosis.    C3-C4: Mild posterior disc osteophyte complex.  Mild left greater than right facet arthropathy.   No significant neural foraminal or spinal canal stenosis.    C4-C5: Mild posterior disc osteophyte complex.  Mild bilateral facet arthropathy.  Mild bilateral uncovertebral joint spurring.  Moderate left and mild-to-moderate right neural foraminal stenosis.  No significant spinal canal stenosis.    C5-C6: Posterior disc osteophyte complex, which mildly effaces the ventral thecal sac.  Moderate bilateral facet arthropathy.  Mild bilateral uncovertebral joint spurring.  Mild right greater than left neural foraminal stenosis.  Ligamentum flavum thickening.  Mild spinal canal stenosis.    C6-C7: Posterior disc osteophyte complex, which effaces the ventral thecal sac with subtle mass effect on the ventral cord surface without definite focal cord signal  abnormality.  Marked left and moderate right uncovertebral joint spurring.  Severe left and moderate right neural foraminal stenosis.  Ligamentum flavum thickening.  Moderate spinal canal stenosis.    C7-T1: Mild posterior disc osteophyte complex.  Moderate left and mild right facet arthropathy.  Mild bilateral uncovertebral joint spurring.  Moderate right and mild left neural foraminal stenosis.  No significant spinal canal stenosis.    Paraspinal muscles & soft tissues: No significant abnormality.    Normal signal voids are present in the vertebral arteries.    Impression  1. Multilevel degenerative changes of the cervical spine, as detailed above, most pronounced at C6-7 and resulting in severe left and moderate right neural foraminal narrowing and moderate spinal canal stenosis with subtle mass effect on the ventral cord surface. No definite focal cord signal abnormality.  2. Mild spinal canal stenosis at C5-6.  No significant spinal canal stenosis at any other level.  3. Mild-to-moderate neural foraminal stenosis at C4-5, C5-6 and C7-T1.      Electronically signed by: Joe Suarez  Date:    04/16/2025  Time:    07:44      Cardiac:  EKG 8/2016 - sinus calvin      Labs:  Lab Results   Component Value Date    WBC 6.67 12/12/2024    HGB 16.8 12/12/2024    HCT 49.4 12/12/2024     12/12/2024    MCV 88 12/12/2024    RDW 12.4 12/12/2024     Lab Results   Component Value Date     12/12/2024    K 4.2 12/12/2024     12/12/2024    CO2 25 12/12/2024    BUN 13 12/12/2024    CREATININE 1.1 12/12/2024    GLU 80 12/12/2024    CALCIUM 9.0 12/12/2024    MG 2.2 10/09/2014     Lab Results   Component Value Date    PROT 6.9 12/12/2024    ALBUMIN 3.7 12/12/2024    BILITOT 1.5 (H) 12/12/2024    AST 28 12/12/2024    ALKPHOS 114 12/12/2024    ALT 35 12/12/2024    GGT 96 (H) 07/05/2016     Lab Results   Component Value Date    INR 1.0 07/05/2016     Lab Results   Component Value Date    CHOL 250 (H) 12/12/2024    HDL 40  "12/12/2024    LDLCALC 158.6 12/12/2024    TRIG 257 (H) 12/12/2024    CHOLHDL 16.0 (L) 12/12/2024     Lab Results   Component Value Date    HGBA1C 5.3 12/12/2024      No results found for: "GPXSVTAN04"  No results found for: "FOLATE"  Lab Results   Component Value Date    TSH 1.821 12/12/2024     Component      Latest Ref Rng 4/3/2025   Uric Acid      3.4 - 7.0 mg/dL 6.4    Sed Rate      <=23 mm/hr 8    CRP      <=8.2 mg/L 0.9    Rheumatoid Factor      <=15.0 IU/mL <13.0            ASSESSMENT & PLAN:  Jimmy Elaine Jr. is a 54 y.o. R-handed male seen in consultation for tremor.     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 I do not appreciate fasics during exam today, but these were noted during NSGY visit and have been noted by pt at home   Consider tx with baclofen, but may be sedating / interfering with ADLs, could try Artane   No pdism noted on exam -- monitor clinically given his age           Other Visit Diagnoses         Resting tremor                Follow up:   After EMG       I spent a total of 45 minutes on the day of the visit.    This includes face to face time with the patient, as well as non-face to face time preparing for and completing the visit (review of prior diagnostic testing and clinical notes, obtaining or reviewing history, documenting clinical information in the EMR, independently interpreting and communicating results to the patient/family and coordinating ongoing care).       I appreciate the opportunity to participate in the care of this patient. Please feel free to contact me with any concerns or questions.       Cheryl Huerta, " ACN-AG  [facility]  [address]  [place] [address]         [1]   Current Outpatient Medications   Medication Sig Dispense Refill    naproxen sodium (ANAPROX) 220 MG tablet Take 220 mg by mouth every 12 (twelve) hours.      albuterol (PROVENTIL HFA) 90 mcg/actuation inhaler Inhale 1 puff into the lungs every 6 (six) hours as needed for Wheezing (wheezing and congestion). Rescue 8.5 g 2     No current facility-administered medications for this visit.

## 2025-06-09 PROBLEM — M48.02 CERVICAL STENOSIS OF SPINAL CANAL: Status: ACTIVE | Noted: 2025-06-09

## 2025-06-09 PROBLEM — R25.1 TREMOR: Status: ACTIVE | Noted: 2025-06-09

## 2025-06-09 NOTE — ASSESSMENT & PLAN NOTE
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 I do not appreciate fasics during exam today, but these were noted during NSGY visit and have been noted by pt at home   Consider tx with baclofen, but may be sedating / interfering with ADLs, could try Artane   No pdism noted on exam -- monitor clinically given his age

## 2025-07-08 ENCOUNTER — PROCEDURE VISIT (OUTPATIENT)
Dept: NEUROLOGY | Facility: CLINIC | Age: 55
End: 2025-07-08
Payer: COMMERCIAL

## 2025-07-08 DIAGNOSIS — G56.21 CUBITAL TUNNEL SYNDROME ON RIGHT: ICD-10-CM

## 2025-07-08 PROCEDURE — 95909 NRV CNDJ TST 5-6 STUDIES: CPT | Mod: S$GLB,,, | Performed by: PSYCHIATRY & NEUROLOGY

## 2025-07-08 PROCEDURE — 95886 MUSC TEST DONE W/N TEST COMP: CPT | Mod: S$GLB,,, | Performed by: PSYCHIATRY & NEUROLOGY

## 2025-07-08 NOTE — PROCEDURES
[facility]  [address]  [place] [address]  [phone]      Full Name: Jimmy Elaine Gender: Male  Patient ID: [id number] YOB: 1970      Visit Date: 7/8/2025 8:22 AM  Age: 54 Years  Examining Physician: Jovita Lofton MD   Referring Physician: Colby Pretty MD  Technologist: FREDA Trinh   Height: 5 feet 8 inch  History: Patient complaining of right arm pain and weakness in the right hand without numbness.  Neck pain that radiates down the right upper extremity.  No issues on the left side, beside a little elbow pain.        Sensory NCS      Nerve / Sites Rec. Site Onset Lat Peak Lat NP Amp Segments Distance Peak Diff Velocity     ms ms µV  cm ms m/s   R Median - Digit II (Antidromic)      Wrist Dig II 2.44 3.02 22.7 Wrist - Dig II 13  53      Ref.   <=3.60 >=15.0 Ref.   >=50   R Median, Ulnar - Transcarpal comparison      Median Palm Wrist 1.52 2.08 33.4 Median Palm - Wrist 8  53      Ref.   <=2.20  Ref.         Ulnar Palm Wrist 1.23 1.83 23.4 Ulnar Palm - Wrist 8  65      Ref.   <=2.20  Ref.           Median Palm - Ulnar Palm  0.25         Ref.  <=0.40    R Ulnar - Digit V (Antidromic)      Wrist Dig V 2.00 2.67 20.9 Wrist - Dig V 11  55      Ref.   <=3.10 >=12.0 Ref.   >=50       Motor NCS      Nerve / Sites Muscle Latency Ref. Amplitude Ref. Amp % Duration Segments Distance Lat Diff Ref. Velocity Ref.     ms ms mV mV % ms  cm ms ms m/s m/s   R Median - APB      Wrist APB 3.19 <=4.00 9.4 >=6.0 100 6.52 Wrist - APB           Elbow APB 7.46  6.3  66.3 6.23 Elbow - Wrist 22.5 4.27  53 >=50   R Ulnar - ADM      Wrist ADM 2.81 <=3.10 13.4 >=7.0 100 8.06 Wrist - ADM           B.Elbow ADM 6.15  11.7  87.6 7.63 B.Elbow - Wrist 20 3.33  60 >=50      A.Elbow ADM 7.85  11.6  86.4 7.38 A.Elbow - B.Elbow 10 1.71  59    R Median, Ulnar - Lumbrical-Interossei      Median Wrist Lumb II 3.17  1.5  100 5.88 Median Wrist - Lumb II 10          Ulnar  Wrist Lumb II 3.50  6.4  440 4.60 Ulnar Wrist - Lumb II 10               Median Wrist - Ulnar Wrist  -0.33 <=0.50         F  Wave      Nerve Fmin Ref.    ms ms   R Median - APB 27.45 <=31.00   R Ulnar - ADM 27.45 <=32.00       EMG Summary Table     Spontaneous Recruitment Activation Duration Amplitude Polyphasia Comment   Muscle Ins Act Fib Fasc Pattern - - - - -   R. Deltoid Normal 0 0 Normal Normal Normal Normal Normal Normal   R. Biceps brachii Normal 0 0 Normal Normal Normal Normal Normal Normal   R. Triceps brachii Normal 0 0 Sl Dec Normal +1 +1 Normal Normal   R. Pronator teres Normal 0 0 Sl Dec Normal Normal Normal Normal Normal   R. First dorsal interosseous Normal 0 0 Normal Normal Normal Normal Normal Normal         Summary: Right upper extremity nerve conduction studies are normal.     Right upper extremity needle examination shows mild reduced recruitment in the C6/7 innervated muscles and mild neurogenic changes in the triceps. No fibrillation potentials were seen.     Left upper extremity was not studied due to patient report of no symptoms in that extremity.     Impression: There is EMG evidence of a mild right C6/7 radiculopathy without evidence of active denervation.     There is no electrophysiologic evidence of peripheral polyneuropathy, median mononeuropathy, ulnar mononeuropathy, nor brachial plexopathy.       Thank you for referring to the [facility] in [place]. Please feel free to contact the clinic if you have any further questions regarding this study or report.    _____________________________  Jovita Lofton MD

## 2025-07-22 ENCOUNTER — OFFICE VISIT (OUTPATIENT)
Dept: NEUROLOGY | Facility: CLINIC | Age: 55
End: 2025-07-22
Payer: COMMERCIAL

## 2025-07-22 ENCOUNTER — LAB VISIT (OUTPATIENT)
Dept: LAB | Facility: HOSPITAL | Age: 55
End: 2025-07-22
Attending: NURSE PRACTITIONER
Payer: COMMERCIAL

## 2025-07-22 VITALS
WEIGHT: 191.13 LBS | DIASTOLIC BLOOD PRESSURE: 75 MMHG | RESPIRATION RATE: 14 BRPM | HEART RATE: 65 BPM | BODY MASS INDEX: 28.97 KG/M2 | SYSTOLIC BLOOD PRESSURE: 118 MMHG | HEIGHT: 68 IN

## 2025-07-22 DIAGNOSIS — R25.1 TREMOR: ICD-10-CM

## 2025-07-22 DIAGNOSIS — G25.2 DYSTONIC TREMOR: ICD-10-CM

## 2025-07-22 DIAGNOSIS — G25.2 DYSTONIC TREMOR: Primary | ICD-10-CM

## 2025-07-22 LAB
CREAT SERPL-MCNC: 1 MG/DL (ref 0.5–1.4)
GFR SERPLBLD CREATININE-BSD FMLA CKD-EPI: >60 ML/MIN/1.73/M2

## 2025-07-22 PROCEDURE — 36415 COLL VENOUS BLD VENIPUNCTURE: CPT | Mod: PO

## 2025-07-22 PROCEDURE — 99999 PR PBB SHADOW E&M-EST. PATIENT-LVL IV: CPT | Mod: PBBFAC,,, | Performed by: NURSE PRACTITIONER

## 2025-07-22 PROCEDURE — 82565 ASSAY OF CREATININE: CPT

## 2025-07-22 RX ORDER — SYRING-NEEDL,DISP,INSUL,0.3 ML 29 G X1/2"
296 SYRINGE, EMPTY DISPOSABLE MISCELLANEOUS ONCE
COMMUNITY

## 2025-07-22 RX ORDER — IBUPROFEN 200 MG
200 TABLET ORAL EVERY 6 HOURS PRN
COMMUNITY

## 2025-07-22 NOTE — ASSESSMENT & PLAN NOTE
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 Discussed additional options for tremor treatment, such as Rx muscle relaxant or anticholinergic. He defers at this time and will let me know if the tremor worsens and begins to affect his ability to work, perform ADLs. We will proceed with a mikie MRI to r/u any intracranial cause for the tremor.